# Patient Record
Sex: MALE | Race: WHITE | NOT HISPANIC OR LATINO | Employment: OTHER | URBAN - METROPOLITAN AREA
[De-identification: names, ages, dates, MRNs, and addresses within clinical notes are randomized per-mention and may not be internally consistent; named-entity substitution may affect disease eponyms.]

---

## 2017-07-26 ENCOUNTER — APPOINTMENT (OUTPATIENT)
Dept: CT IMAGING | Facility: HOSPITAL | Age: 82
End: 2017-07-26

## 2017-07-26 ENCOUNTER — HOSPITAL ENCOUNTER (INPATIENT)
Facility: HOSPITAL | Age: 82
LOS: 7 days | Discharge: NURSING, REHAB OR LONG-TERM CARE | End: 2017-08-03
Attending: EMERGENCY MEDICINE | Admitting: INTERNAL MEDICINE

## 2017-07-26 ENCOUNTER — APPOINTMENT (OUTPATIENT)
Dept: GENERAL RADIOLOGY | Facility: HOSPITAL | Age: 82
End: 2017-07-26

## 2017-07-26 DIAGNOSIS — R41.0 CONFUSION: ICD-10-CM

## 2017-07-26 DIAGNOSIS — D72.829 LEUKOCYTOSIS, UNSPECIFIED TYPE: ICD-10-CM

## 2017-07-26 DIAGNOSIS — N28.9 RENAL INSUFFICIENCY: ICD-10-CM

## 2017-07-26 DIAGNOSIS — R10.32 LEFT LOWER QUADRANT PAIN: Primary | ICD-10-CM

## 2017-07-26 DIAGNOSIS — R53.1 GENERALIZED WEAKNESS: ICD-10-CM

## 2017-07-26 PROBLEM — N17.9 AKI (ACUTE KIDNEY INJURY) (HCC): Status: ACTIVE | Noted: 2017-07-26

## 2017-07-26 LAB
ALBUMIN SERPL-MCNC: 3.8 G/DL (ref 3.5–5.2)
ALBUMIN/GLOB SERPL: 0.9 G/DL
ALP SERPL-CCNC: 71 U/L (ref 39–117)
ALT SERPL W P-5'-P-CCNC: 48 U/L (ref 1–41)
ANION GAP SERPL CALCULATED.3IONS-SCNC: 13.6 MMOL/L
AST SERPL-CCNC: 44 U/L (ref 1–40)
BASOPHILS # BLD AUTO: 0.01 10*3/MM3 (ref 0–0.2)
BASOPHILS NFR BLD AUTO: 0.1 % (ref 0–1.5)
BILIRUB SERPL-MCNC: 0.7 MG/DL (ref 0.1–1.2)
BILIRUB UR QL STRIP: NEGATIVE
BUN BLD-MCNC: 32 MG/DL (ref 8–23)
BUN/CREAT SERPL: 20.4 (ref 7–25)
CALCIUM SPEC-SCNC: 10.8 MG/DL (ref 8.6–10.5)
CHLORIDE SERPL-SCNC: 94 MMOL/L (ref 98–107)
CLARITY UR: CLEAR
CO2 SERPL-SCNC: 29.4 MMOL/L (ref 22–29)
COLOR UR: YELLOW
CREAT BLD-MCNC: 1.57 MG/DL (ref 0.76–1.27)
DEPRECATED RDW RBC AUTO: 47.5 FL (ref 37–54)
EOSINOPHIL # BLD AUTO: 0.01 10*3/MM3 (ref 0–0.7)
EOSINOPHIL NFR BLD AUTO: 0.1 % (ref 0.3–6.2)
ERYTHROCYTE [DISTWIDTH] IN BLOOD BY AUTOMATED COUNT: 13.8 % (ref 11.5–14.5)
GFR SERPL CREATININE-BSD FRML MDRD: 42 ML/MIN/1.73
GLOBULIN UR ELPH-MCNC: 4.4 GM/DL
GLUCOSE BLD-MCNC: 120 MG/DL (ref 65–99)
GLUCOSE BLDC GLUCOMTR-MCNC: 112 MG/DL (ref 70–130)
GLUCOSE UR STRIP-MCNC: NEGATIVE MG/DL
HCT VFR BLD AUTO: 42.4 % (ref 40.4–52.2)
HGB BLD-MCNC: 13.7 G/DL (ref 13.7–17.6)
HGB UR QL STRIP.AUTO: ABNORMAL
HOLD SPECIMEN: NORMAL
HOLD SPECIMEN: NORMAL
IMM GRANULOCYTES # BLD: 0.11 10*3/MM3 (ref 0–0.03)
IMM GRANULOCYTES NFR BLD: 0.8 % (ref 0–0.5)
KETONES UR QL STRIP: NEGATIVE
LEUKOCYTE ESTERASE UR QL STRIP.AUTO: ABNORMAL
LYMPHOCYTES # BLD AUTO: 1.75 10*3/MM3 (ref 0.9–4.8)
LYMPHOCYTES NFR BLD AUTO: 12.7 % (ref 19.6–45.3)
MAGNESIUM SERPL-MCNC: 2.6 MG/DL (ref 1.6–2.4)
MCH RBC QN AUTO: 30.3 PG (ref 27–32.7)
MCHC RBC AUTO-ENTMCNC: 32.3 G/DL (ref 32.6–36.4)
MCV RBC AUTO: 93.8 FL (ref 79.8–96.2)
MONOCYTES # BLD AUTO: 1.74 10*3/MM3 (ref 0.2–1.2)
MONOCYTES NFR BLD AUTO: 12.6 % (ref 5–12)
NEUTROPHILS # BLD AUTO: 10.17 10*3/MM3 (ref 1.9–8.1)
NEUTROPHILS NFR BLD AUTO: 73.7 % (ref 42.7–76)
NITRITE UR QL STRIP: NEGATIVE
PH UR STRIP.AUTO: 7 [PH] (ref 5–8)
PLATELET # BLD AUTO: 440 10*3/MM3 (ref 140–500)
PMV BLD AUTO: 9.7 FL (ref 6–12)
POTASSIUM BLD-SCNC: 4.5 MMOL/L (ref 3.5–5.2)
PROT SERPL-MCNC: 8.2 G/DL (ref 6–8.5)
PROT UR QL STRIP: NEGATIVE
RBC # BLD AUTO: 4.52 10*6/MM3 (ref 4.6–6)
SODIUM BLD-SCNC: 137 MMOL/L (ref 136–145)
SP GR UR STRIP: 1.01 (ref 1–1.03)
TROPONIN T SERPL-MCNC: <0.01 NG/ML (ref 0–0.03)
UROBILINOGEN UR QL STRIP: ABNORMAL
WBC NRBC COR # BLD: 13.79 10*3/MM3 (ref 4.5–10.7)
WHOLE BLOOD HOLD SPECIMEN: NORMAL
WHOLE BLOOD HOLD SPECIMEN: NORMAL

## 2017-07-26 PROCEDURE — 83735 ASSAY OF MAGNESIUM: CPT | Performed by: EMERGENCY MEDICINE

## 2017-07-26 PROCEDURE — 93005 ELECTROCARDIOGRAM TRACING: CPT

## 2017-07-26 PROCEDURE — 87186 SC STD MICRODIL/AGAR DIL: CPT | Performed by: EMERGENCY MEDICINE

## 2017-07-26 PROCEDURE — 93010 ELECTROCARDIOGRAM REPORT: CPT | Performed by: INTERNAL MEDICINE

## 2017-07-26 PROCEDURE — 81003 URINALYSIS AUTO W/O SCOPE: CPT | Performed by: EMERGENCY MEDICINE

## 2017-07-26 PROCEDURE — 87077 CULTURE AEROBIC IDENTIFY: CPT | Performed by: EMERGENCY MEDICINE

## 2017-07-26 PROCEDURE — 71010 HC CHEST PA OR AP: CPT

## 2017-07-26 PROCEDURE — 87086 URINE CULTURE/COLONY COUNT: CPT | Performed by: EMERGENCY MEDICINE

## 2017-07-26 PROCEDURE — 74176 CT ABD & PELVIS W/O CONTRAST: CPT

## 2017-07-26 PROCEDURE — 82962 GLUCOSE BLOOD TEST: CPT

## 2017-07-26 PROCEDURE — 80053 COMPREHEN METABOLIC PANEL: CPT | Performed by: EMERGENCY MEDICINE

## 2017-07-26 PROCEDURE — 84484 ASSAY OF TROPONIN QUANT: CPT | Performed by: EMERGENCY MEDICINE

## 2017-07-26 PROCEDURE — 73030 X-RAY EXAM OF SHOULDER: CPT

## 2017-07-26 PROCEDURE — 81001 URINALYSIS AUTO W/SCOPE: CPT | Performed by: EMERGENCY MEDICINE

## 2017-07-26 PROCEDURE — 85025 COMPLETE CBC W/AUTO DIFF WBC: CPT | Performed by: EMERGENCY MEDICINE

## 2017-07-26 PROCEDURE — 99285 EMERGENCY DEPT VISIT HI MDM: CPT

## 2017-07-26 PROCEDURE — 70450 CT HEAD/BRAIN W/O DYE: CPT

## 2017-07-26 RX ORDER — DIPHENOXYLATE HYDROCHLORIDE AND ATROPINE SULFATE 2.5; .025 MG/1; MG/1
1 TABLET ORAL DAILY
COMMUNITY

## 2017-07-26 RX ORDER — AZELASTINE 1 MG/ML
2 SPRAY, METERED NASAL DAILY PRN
COMMUNITY

## 2017-07-26 RX ORDER — FENOFIBRATE 145 MG/1
145 TABLET, COATED ORAL DAILY
COMMUNITY
End: 2017-08-03 | Stop reason: HOSPADM

## 2017-07-26 RX ORDER — ACETAMINOPHEN 325 MG/1
650 TABLET ORAL EVERY 4 HOURS PRN
Status: DISCONTINUED | OUTPATIENT
Start: 2017-07-26 | End: 2017-08-03 | Stop reason: HOSPADM

## 2017-07-26 RX ORDER — SODIUM CHLORIDE 0.9 % (FLUSH) 0.9 %
1-10 SYRINGE (ML) INJECTION AS NEEDED
Status: DISCONTINUED | OUTPATIENT
Start: 2017-07-26 | End: 2017-08-03 | Stop reason: HOSPADM

## 2017-07-26 RX ORDER — ONDANSETRON 4 MG/1
4 TABLET, FILM COATED ORAL EVERY 6 HOURS PRN
Status: DISCONTINUED | OUTPATIENT
Start: 2017-07-26 | End: 2017-08-03 | Stop reason: HOSPADM

## 2017-07-26 RX ORDER — DOXAZOSIN MESYLATE 4 MG/1
4 TABLET ORAL EVERY MORNING
COMMUNITY

## 2017-07-26 RX ORDER — SODIUM CHLORIDE 9 MG/ML
125 INJECTION, SOLUTION INTRAVENOUS CONTINUOUS
Status: DISCONTINUED | OUTPATIENT
Start: 2017-07-26 | End: 2017-07-28

## 2017-07-26 RX ORDER — CHLORAL HYDRATE 500 MG
1000 CAPSULE ORAL
COMMUNITY
End: 2017-08-03 | Stop reason: HOSPADM

## 2017-07-26 RX ORDER — PREGABALIN 50 MG/1
50 CAPSULE ORAL 2 TIMES DAILY
COMMUNITY

## 2017-07-26 RX ORDER — RIVASTIGMINE TARTRATE 1.5 MG/1
1.5 CAPSULE ORAL 2 TIMES DAILY
COMMUNITY

## 2017-07-26 RX ORDER — PREDNISONE 10 MG/1
10 TABLET ORAL DAILY
COMMUNITY
Start: 2017-07-21 | End: 2017-08-03 | Stop reason: HOSPADM

## 2017-07-26 RX ORDER — TRAMADOL HYDROCHLORIDE 50 MG/1
50 TABLET ORAL EVERY 6 HOURS PRN
Status: ON HOLD | COMMUNITY
End: 2017-08-03

## 2017-07-26 RX ORDER — POLYETHYLENE GLYCOL 3350 17 G/17G
17 POWDER, FOR SOLUTION ORAL DAILY
COMMUNITY
End: 2017-08-03 | Stop reason: HOSPADM

## 2017-07-26 RX ORDER — MEMANTINE HYDROCHLORIDE 10 MG/1
10 TABLET ORAL NIGHTLY
COMMUNITY

## 2017-07-26 RX ORDER — CARVEDILOL 3.12 MG/1
3.12 TABLET ORAL 2 TIMES DAILY WITH MEALS
COMMUNITY

## 2017-07-26 RX ORDER — ASPIRIN 81 MG/1
81 TABLET ORAL DAILY
COMMUNITY

## 2017-07-26 RX ORDER — METAXALONE 800 MG/1
800 TABLET ORAL EVERY 8 HOURS
COMMUNITY
End: 2017-08-03 | Stop reason: HOSPADM

## 2017-07-26 RX ORDER — SODIUM CHLORIDE 0.9 % (FLUSH) 0.9 %
10 SYRINGE (ML) INJECTION AS NEEDED
Status: DISCONTINUED | OUTPATIENT
Start: 2017-07-26 | End: 2017-08-03 | Stop reason: HOSPADM

## 2017-07-26 RX ORDER — PREGABALIN 75 MG/1
75 CAPSULE ORAL NIGHTLY
COMMUNITY
End: 2017-08-03 | Stop reason: HOSPADM

## 2017-07-26 RX ORDER — SODIUM CHLORIDE 9 MG/ML
125 INJECTION, SOLUTION INTRAVENOUS CONTINUOUS
Status: DISCONTINUED | OUTPATIENT
Start: 2017-07-26 | End: 2017-07-26

## 2017-07-26 RX ORDER — ONDANSETRON 4 MG/1
4 TABLET, ORALLY DISINTEGRATING ORAL EVERY 6 HOURS PRN
Status: DISCONTINUED | OUTPATIENT
Start: 2017-07-26 | End: 2017-08-03 | Stop reason: HOSPADM

## 2017-07-26 RX ORDER — ONDANSETRON 2 MG/ML
4 INJECTION INTRAMUSCULAR; INTRAVENOUS EVERY 6 HOURS PRN
Status: DISCONTINUED | OUTPATIENT
Start: 2017-07-26 | End: 2017-08-03 | Stop reason: HOSPADM

## 2017-07-26 RX ADMIN — SODIUM CHLORIDE 500 ML: 9 INJECTION, SOLUTION INTRAVENOUS at 16:47

## 2017-07-26 RX ADMIN — SODIUM CHLORIDE 125 ML/HR: 9 INJECTION, SOLUTION INTRAVENOUS at 18:54

## 2017-07-27 ENCOUNTER — APPOINTMENT (OUTPATIENT)
Dept: MRI IMAGING | Facility: HOSPITAL | Age: 82
End: 2017-07-27
Attending: INTERNAL MEDICINE

## 2017-07-27 ENCOUNTER — APPOINTMENT (OUTPATIENT)
Dept: GENERAL RADIOLOGY | Facility: HOSPITAL | Age: 82
End: 2017-07-27
Attending: ORTHOPAEDIC SURGERY

## 2017-07-27 ENCOUNTER — APPOINTMENT (OUTPATIENT)
Dept: GENERAL RADIOLOGY | Facility: HOSPITAL | Age: 82
End: 2017-07-27

## 2017-07-27 PROBLEM — D72.829 LEUKOCYTOSIS: Status: ACTIVE | Noted: 2017-07-27

## 2017-07-27 PROBLEM — R13.12 OROPHARYNGEAL DYSPHAGIA: Status: ACTIVE | Noted: 2017-07-27

## 2017-07-27 PROBLEM — M25.511 RIGHT SHOULDER PAIN: Status: ACTIVE | Noted: 2017-07-27

## 2017-07-27 PROBLEM — I48.0 PAROXYSMAL ATRIAL FIBRILLATION (HCC): Status: ACTIVE | Noted: 2017-07-27

## 2017-07-27 PROBLEM — I48.91 A-FIB (HCC): Status: ACTIVE | Noted: 2017-07-27

## 2017-07-27 PROBLEM — R41.0 CONFUSION: Status: ACTIVE | Noted: 2017-07-27

## 2017-07-27 LAB
ALBUMIN SERPL-MCNC: 3 G/DL (ref 3.5–5.2)
ALBUMIN/GLOB SERPL: 0.8 G/DL
ALP SERPL-CCNC: 57 U/L (ref 39–117)
ALT SERPL W P-5'-P-CCNC: 31 U/L (ref 1–41)
ANION GAP SERPL CALCULATED.3IONS-SCNC: 15.3 MMOL/L
AST SERPL-CCNC: 26 U/L (ref 1–40)
B PERT DNA SPEC QL NAA+PROBE: NOT DETECTED
BACTERIA UR QL AUTO: ABNORMAL /HPF
BASOPHILS # BLD AUTO: 0.01 10*3/MM3 (ref 0–0.2)
BASOPHILS NFR BLD AUTO: 0.1 % (ref 0–1.5)
BILIRUB SERPL-MCNC: 0.7 MG/DL (ref 0.1–1.2)
BUN BLD-MCNC: 26 MG/DL (ref 8–23)
BUN/CREAT SERPL: 21.8 (ref 7–25)
C PNEUM DNA NPH QL NAA+NON-PROBE: NOT DETECTED
CALCIUM SPEC-SCNC: 9 MG/DL (ref 8.6–10.5)
CHLORIDE SERPL-SCNC: 101 MMOL/L (ref 98–107)
CHOLEST SERPL-MCNC: 80 MG/DL (ref 0–200)
CO2 SERPL-SCNC: 22.7 MMOL/L (ref 22–29)
CREAT BLD-MCNC: 1.19 MG/DL (ref 0.76–1.27)
CRP SERPL-MCNC: 25.66 MG/DL (ref 0–0.5)
DEPRECATED RDW RBC AUTO: 46.9 FL (ref 37–54)
DEPRECATED RDW RBC AUTO: 47.4 FL (ref 37–54)
EOSINOPHIL # BLD AUTO: 0.01 10*3/MM3 (ref 0–0.7)
EOSINOPHIL NFR BLD AUTO: 0.1 % (ref 0.3–6.2)
ERYTHROCYTE [DISTWIDTH] IN BLOOD BY AUTOMATED COUNT: 13.8 % (ref 11.5–14.5)
ERYTHROCYTE [DISTWIDTH] IN BLOOD BY AUTOMATED COUNT: 13.8 % (ref 11.5–14.5)
ERYTHROCYTE [SEDIMENTATION RATE] IN BLOOD: 73 MM/HR (ref 0–20)
FLUAV H1 2009 PAND RNA NPH QL NAA+PROBE: NOT DETECTED
FLUAV H1 HA GENE NPH QL NAA+PROBE: NOT DETECTED
FLUAV H3 RNA NPH QL NAA+PROBE: NOT DETECTED
FLUAV SUBTYP SPEC NAA+PROBE: NOT DETECTED
FLUBV RNA ISLT QL NAA+PROBE: NOT DETECTED
GFR SERPL CREATININE-BSD FRML MDRD: 58 ML/MIN/1.73
GLOBULIN UR ELPH-MCNC: 3.8 GM/DL
GLUCOSE BLD-MCNC: 107 MG/DL (ref 65–99)
GLUCOSE BLDC GLUCOMTR-MCNC: 116 MG/DL (ref 70–130)
GLUCOSE BLDC GLUCOMTR-MCNC: 157 MG/DL (ref 70–130)
GLUCOSE BLDC GLUCOMTR-MCNC: 97 MG/DL (ref 70–130)
HADV DNA SPEC NAA+PROBE: NOT DETECTED
HBA1C MFR BLD: 5 % (ref 4.8–5.6)
HCOV 229E RNA SPEC QL NAA+PROBE: NOT DETECTED
HCOV HKU1 RNA SPEC QL NAA+PROBE: NOT DETECTED
HCOV NL63 RNA SPEC QL NAA+PROBE: NOT DETECTED
HCOV OC43 RNA SPEC QL NAA+PROBE: NOT DETECTED
HCT VFR BLD AUTO: 37.2 % (ref 40.4–52.2)
HCT VFR BLD AUTO: 37.4 % (ref 40.4–52.2)
HDLC SERPL-MCNC: 19 MG/DL (ref 40–60)
HGB BLD-MCNC: 12.1 G/DL (ref 13.7–17.6)
HGB BLD-MCNC: 12.2 G/DL (ref 13.7–17.6)
HMPV RNA NPH QL NAA+NON-PROBE: NOT DETECTED
HPIV1 RNA SPEC QL NAA+PROBE: NOT DETECTED
HPIV2 RNA SPEC QL NAA+PROBE: NOT DETECTED
HPIV3 RNA NPH QL NAA+PROBE: NOT DETECTED
HPIV4 P GENE NPH QL NAA+PROBE: NOT DETECTED
HYALINE CASTS UR QL AUTO: ABNORMAL /LPF
IMM GRANULOCYTES # BLD: 0.11 10*3/MM3 (ref 0–0.03)
IMM GRANULOCYTES NFR BLD: 0.8 % (ref 0–0.5)
LDLC SERPL CALC-MCNC: 48 MG/DL (ref 0–100)
LDLC/HDLC SERPL: 2.51 {RATIO}
LYMPHOCYTES # BLD AUTO: 1.55 10*3/MM3 (ref 0.9–4.8)
LYMPHOCYTES NFR BLD AUTO: 11.2 % (ref 19.6–45.3)
M PNEUMO IGG SER IA-ACNC: NOT DETECTED
MCH RBC QN AUTO: 30.4 PG (ref 27–32.7)
MCH RBC QN AUTO: 30.5 PG (ref 27–32.7)
MCHC RBC AUTO-ENTMCNC: 32.4 G/DL (ref 32.6–36.4)
MCHC RBC AUTO-ENTMCNC: 32.8 G/DL (ref 32.6–36.4)
MCV RBC AUTO: 92.8 FL (ref 79.8–96.2)
MCV RBC AUTO: 94.2 FL (ref 79.8–96.2)
MONOCYTES # BLD AUTO: 1.88 10*3/MM3 (ref 0.2–1.2)
MONOCYTES NFR BLD AUTO: 13.5 % (ref 5–12)
NEUTROPHILS # BLD AUTO: 10.34 10*3/MM3 (ref 1.9–8.1)
NEUTROPHILS NFR BLD AUTO: 74.3 % (ref 42.7–76)
PLATELET # BLD AUTO: 375 10*3/MM3 (ref 140–500)
PLATELET # BLD AUTO: 401 10*3/MM3 (ref 140–500)
PMV BLD AUTO: 9.6 FL (ref 6–12)
PMV BLD AUTO: 9.7 FL (ref 6–12)
POTASSIUM BLD-SCNC: 4.1 MMOL/L (ref 3.5–5.2)
PROCALCITONIN SERPL-MCNC: 0.29 NG/ML (ref 0.1–0.25)
PROT SERPL-MCNC: 6.8 G/DL (ref 6–8.5)
RBC # BLD AUTO: 3.97 10*6/MM3 (ref 4.6–6)
RBC # BLD AUTO: 4.01 10*6/MM3 (ref 4.6–6)
RBC # UR: ABNORMAL /HPF
REF LAB TEST METHOD: ABNORMAL
RHINOVIRUS RNA SPEC NAA+PROBE: NOT DETECTED
RSV RNA NPH QL NAA+NON-PROBE: NOT DETECTED
SODIUM BLD-SCNC: 139 MMOL/L (ref 136–145)
SQUAMOUS #/AREA URNS HPF: ABNORMAL /HPF
TRIGL SERPL-MCNC: 67 MG/DL (ref 0–150)
TSH SERPL DL<=0.05 MIU/L-ACNC: 0.96 MIU/ML (ref 0.27–4.2)
VIT B12 BLD-MCNC: 377 PG/ML (ref 211–946)
VLDLC SERPL-MCNC: 13.4 MG/DL (ref 5–40)
WBC NRBC COR # BLD: 13.9 10*3/MM3 (ref 4.5–10.7)
WBC NRBC COR # BLD: 15.31 10*3/MM3 (ref 4.5–10.7)
WBC UR QL AUTO: ABNORMAL /HPF

## 2017-07-27 PROCEDURE — 73110 X-RAY EXAM OF WRIST: CPT

## 2017-07-27 PROCEDURE — G0378 HOSPITAL OBSERVATION PER HR: HCPCS

## 2017-07-27 PROCEDURE — 80061 LIPID PANEL: CPT | Performed by: INTERNAL MEDICINE

## 2017-07-27 PROCEDURE — G8996 SWALLOW CURRENT STATUS: HCPCS | Performed by: SPEECH-LANGUAGE PATHOLOGIST

## 2017-07-27 PROCEDURE — 87486 CHLMYD PNEUM DNA AMP PROBE: CPT | Performed by: INTERNAL MEDICINE

## 2017-07-27 PROCEDURE — 70544 MR ANGIOGRAPHY HEAD W/O DYE: CPT

## 2017-07-27 PROCEDURE — 83036 HEMOGLOBIN GLYCOSYLATED A1C: CPT | Performed by: INTERNAL MEDICINE

## 2017-07-27 PROCEDURE — 0 GADOBENATE DIMEGLUMINE 529 MG/ML SOLUTION: Performed by: INTERNAL MEDICINE

## 2017-07-27 PROCEDURE — 92610 EVALUATE SWALLOWING FUNCTION: CPT | Performed by: SPEECH-LANGUAGE PATHOLOGIST

## 2017-07-27 PROCEDURE — 97162 PT EVAL MOD COMPLEX 30 MIN: CPT

## 2017-07-27 PROCEDURE — 70553 MRI BRAIN STEM W/O & W/DYE: CPT

## 2017-07-27 PROCEDURE — 87633 RESP VIRUS 12-25 TARGETS: CPT | Performed by: INTERNAL MEDICINE

## 2017-07-27 PROCEDURE — 74230 X-RAY XM SWLNG FUNCJ C+: CPT

## 2017-07-27 PROCEDURE — G8998 SWALLOW D/C STATUS: HCPCS | Performed by: SPEECH-LANGUAGE PATHOLOGIST

## 2017-07-27 PROCEDURE — 97110 THERAPEUTIC EXERCISES: CPT

## 2017-07-27 PROCEDURE — 25010000002 MORPHINE PER 10 MG: Performed by: INTERNAL MEDICINE

## 2017-07-27 PROCEDURE — G8997 SWALLOW GOAL STATUS: HCPCS | Performed by: SPEECH-LANGUAGE PATHOLOGIST

## 2017-07-27 PROCEDURE — 87581 M.PNEUMON DNA AMP PROBE: CPT | Performed by: INTERNAL MEDICINE

## 2017-07-27 PROCEDURE — 87040 BLOOD CULTURE FOR BACTERIA: CPT | Performed by: INTERNAL MEDICINE

## 2017-07-27 PROCEDURE — 84443 ASSAY THYROID STIM HORMONE: CPT | Performed by: INTERNAL MEDICINE

## 2017-07-27 PROCEDURE — 70549 MR ANGIOGRAPH NECK W/O&W/DYE: CPT

## 2017-07-27 PROCEDURE — 85652 RBC SED RATE AUTOMATED: CPT | Performed by: ORTHOPAEDIC SURGERY

## 2017-07-27 PROCEDURE — 85025 COMPLETE CBC W/AUTO DIFF WBC: CPT | Performed by: INTERNAL MEDICINE

## 2017-07-27 PROCEDURE — 85027 COMPLETE CBC AUTOMATED: CPT | Performed by: ORTHOPAEDIC SURGERY

## 2017-07-27 PROCEDURE — 25010000002 CEFTRIAXONE PER 250 MG: Performed by: INTERNAL MEDICINE

## 2017-07-27 PROCEDURE — 63710000001 PREDNISONE PER 5 MG: Performed by: INTERNAL MEDICINE

## 2017-07-27 PROCEDURE — 92611 MOTION FLUOROSCOPY/SWALLOW: CPT | Performed by: SPEECH-LANGUAGE PATHOLOGIST

## 2017-07-27 PROCEDURE — 82962 GLUCOSE BLOOD TEST: CPT

## 2017-07-27 PROCEDURE — 71020 HC CHEST PA AND LATERAL: CPT

## 2017-07-27 PROCEDURE — 86140 C-REACTIVE PROTEIN: CPT | Performed by: ORTHOPAEDIC SURGERY

## 2017-07-27 PROCEDURE — A9577 INJ MULTIHANCE: HCPCS | Performed by: INTERNAL MEDICINE

## 2017-07-27 PROCEDURE — 87798 DETECT AGENT NOS DNA AMP: CPT | Performed by: INTERNAL MEDICINE

## 2017-07-27 PROCEDURE — 80053 COMPREHEN METABOLIC PANEL: CPT | Performed by: INTERNAL MEDICINE

## 2017-07-27 PROCEDURE — 84145 PROCALCITONIN (PCT): CPT | Performed by: INTERNAL MEDICINE

## 2017-07-27 PROCEDURE — 82607 VITAMIN B-12: CPT | Performed by: INTERNAL MEDICINE

## 2017-07-27 RX ORDER — MORPHINE SULFATE 2 MG/ML
2 INJECTION, SOLUTION INTRAMUSCULAR; INTRAVENOUS
Status: DISCONTINUED | OUTPATIENT
Start: 2017-07-27 | End: 2017-08-03 | Stop reason: HOSPADM

## 2017-07-27 RX ORDER — TERAZOSIN 5 MG/1
5 CAPSULE ORAL NIGHTLY
Status: DISCONTINUED | OUTPATIENT
Start: 2017-07-27 | End: 2017-08-03 | Stop reason: HOSPADM

## 2017-07-27 RX ORDER — NALOXONE HYDROCHLORIDE 1 MG/ML
0.4 INJECTION INTRAMUSCULAR; INTRAVENOUS; SUBCUTANEOUS
Status: DISCONTINUED | OUTPATIENT
Start: 2017-07-27 | End: 2017-08-03 | Stop reason: HOSPADM

## 2017-07-27 RX ORDER — PREGABALIN 50 MG/1
50 CAPSULE ORAL 2 TIMES DAILY
Status: DISCONTINUED | OUTPATIENT
Start: 2017-07-27 | End: 2017-08-03 | Stop reason: HOSPADM

## 2017-07-27 RX ORDER — METHYLPREDNISOLONE ACETATE 40 MG/ML
40 INJECTION, SUSPENSION INTRA-ARTICULAR; INTRALESIONAL; INTRAMUSCULAR; SOFT TISSUE ONCE
Status: COMPLETED | OUTPATIENT
Start: 2017-07-27 | End: 2017-07-28

## 2017-07-27 RX ORDER — CARVEDILOL 3.12 MG/1
3.12 TABLET ORAL 2 TIMES DAILY WITH MEALS
Status: DISCONTINUED | OUTPATIENT
Start: 2017-07-27 | End: 2017-08-03 | Stop reason: HOSPADM

## 2017-07-27 RX ORDER — DOCUSATE SODIUM 50 MG/5 ML
100 LIQUID (ML) ORAL 2 TIMES DAILY
Status: DISCONTINUED | OUTPATIENT
Start: 2017-07-27 | End: 2017-07-29

## 2017-07-27 RX ORDER — CEFTRIAXONE SODIUM 1 G/50ML
1 INJECTION, SOLUTION INTRAVENOUS EVERY 24 HOURS
Status: DISCONTINUED | OUTPATIENT
Start: 2017-07-27 | End: 2017-07-31

## 2017-07-27 RX ORDER — OXYCODONE HYDROCHLORIDE AND ACETAMINOPHEN 5; 325 MG/1; MG/1
1 TABLET ORAL EVERY 4 HOURS PRN
Status: DISCONTINUED | OUTPATIENT
Start: 2017-07-27 | End: 2017-08-03 | Stop reason: HOSPADM

## 2017-07-27 RX ORDER — ASPIRIN 81 MG/1
81 TABLET ORAL DAILY
Status: DISCONTINUED | OUTPATIENT
Start: 2017-07-27 | End: 2017-07-27 | Stop reason: CLARIF

## 2017-07-27 RX ORDER — POLYETHYLENE GLYCOL 3350 17 G/17G
17 POWDER, FOR SOLUTION ORAL DAILY
Status: DISCONTINUED | OUTPATIENT
Start: 2017-07-27 | End: 2017-07-30

## 2017-07-27 RX ORDER — ASPIRIN 81 MG/1
81 TABLET, CHEWABLE ORAL DAILY
Status: DISCONTINUED | OUTPATIENT
Start: 2017-07-27 | End: 2017-08-03 | Stop reason: HOSPADM

## 2017-07-27 RX ORDER — PREDNISONE 10 MG/1
10 TABLET ORAL DAILY
Status: COMPLETED | OUTPATIENT
Start: 2017-07-27 | End: 2017-07-28

## 2017-07-27 RX ORDER — ATORVASTATIN CALCIUM 80 MG/1
80 TABLET, FILM COATED ORAL NIGHTLY
Status: DISCONTINUED | OUTPATIENT
Start: 2017-07-27 | End: 2017-08-03 | Stop reason: HOSPADM

## 2017-07-27 RX ORDER — CHOLECALCIFEROL (VITAMIN D3) 125 MCG
1000 CAPSULE ORAL DAILY
Status: DISCONTINUED | OUTPATIENT
Start: 2017-07-27 | End: 2017-08-03 | Stop reason: HOSPADM

## 2017-07-27 RX ORDER — MORPHINE SULFATE 2 MG/ML
1 INJECTION, SOLUTION INTRAMUSCULAR; INTRAVENOUS EVERY 4 HOURS PRN
Status: DISCONTINUED | OUTPATIENT
Start: 2017-07-27 | End: 2017-08-03 | Stop reason: HOSPADM

## 2017-07-27 RX ORDER — LIDOCAINE HYDROCHLORIDE 10 MG/ML
10 INJECTION, SOLUTION INFILTRATION; PERINEURAL ONCE
Status: DISCONTINUED | OUTPATIENT
Start: 2017-07-27 | End: 2017-07-27

## 2017-07-27 RX ORDER — DOCUSATE SODIUM 100 MG/1
100 CAPSULE, LIQUID FILLED ORAL 2 TIMES DAILY
Status: DISCONTINUED | OUTPATIENT
Start: 2017-07-27 | End: 2017-07-27

## 2017-07-27 RX ORDER — LIDOCAINE HYDROCHLORIDE 10 MG/ML
10 INJECTION, SOLUTION INFILTRATION; PERINEURAL ONCE
Status: COMPLETED | OUTPATIENT
Start: 2017-07-27 | End: 2017-07-28

## 2017-07-27 RX ORDER — SODIUM CHLORIDE 0.9 % (FLUSH) 0.9 %
1-10 SYRINGE (ML) INJECTION AS NEEDED
Status: DISCONTINUED | OUTPATIENT
Start: 2017-07-27 | End: 2017-08-03 | Stop reason: HOSPADM

## 2017-07-27 RX ADMIN — CEFTRIAXONE SODIUM 1 G: 1 INJECTION, SOLUTION INTRAVENOUS at 22:09

## 2017-07-27 RX ADMIN — MORPHINE SULFATE 2 MG: 2 INJECTION, SOLUTION INTRAMUSCULAR; INTRAVENOUS at 02:20

## 2017-07-27 RX ADMIN — CYANOCOBALAMIN TAB 500 MCG 1000 MCG: 500 TAB at 13:49

## 2017-07-27 RX ADMIN — OXYCODONE HYDROCHLORIDE AND ACETAMINOPHEN 1 TABLET: 5; 325 TABLET ORAL at 23:59

## 2017-07-27 RX ADMIN — CARVEDILOL 3.12 MG: 3.12 TABLET, FILM COATED ORAL at 13:49

## 2017-07-27 RX ADMIN — PREGABALIN 50 MG: 50 CAPSULE ORAL at 13:54

## 2017-07-27 RX ADMIN — ATORVASTATIN CALCIUM 80 MG: 80 TABLET, FILM COATED ORAL at 20:01

## 2017-07-27 RX ADMIN — MORPHINE SULFATE 1 MG: 2 INJECTION, SOLUTION INTRAMUSCULAR; INTRAVENOUS at 19:55

## 2017-07-27 RX ADMIN — APIXABAN 2.5 MG: 2.5 TABLET, FILM COATED ORAL at 19:54

## 2017-07-27 RX ADMIN — GADOBENATE DIMEGLUMINE 15 ML: 529 INJECTION, SOLUTION INTRAVENOUS at 12:11

## 2017-07-27 RX ADMIN — APIXABAN 2.5 MG: 2.5 TABLET, FILM COATED ORAL at 13:49

## 2017-07-27 RX ADMIN — SODIUM CHLORIDE 125 ML/HR: 9 INJECTION, SOLUTION INTRAVENOUS at 02:36

## 2017-07-27 RX ADMIN — TERAZOSIN HYDROCHLORIDE 5 MG: 5 CAPSULE ORAL at 20:01

## 2017-07-27 RX ADMIN — ASPIRIN 81 MG: 81 TABLET, CHEWABLE ORAL at 13:49

## 2017-07-27 RX ADMIN — SODIUM CHLORIDE 125 ML/HR: 9 INJECTION, SOLUTION INTRAVENOUS at 23:59

## 2017-07-27 RX ADMIN — CARVEDILOL 3.12 MG: 3.12 TABLET, FILM COATED ORAL at 19:54

## 2017-07-27 RX ADMIN — PREDNISONE 10 MG: 10 TABLET ORAL at 13:49

## 2017-07-28 ENCOUNTER — APPOINTMENT (OUTPATIENT)
Dept: CT IMAGING | Facility: HOSPITAL | Age: 82
End: 2017-07-28
Attending: INTERNAL MEDICINE

## 2017-07-28 LAB
ANION GAP SERPL CALCULATED.3IONS-SCNC: 9.7 MMOL/L
BASOPHILS # BLD AUTO: 0.01 10*3/MM3 (ref 0–0.2)
BASOPHILS NFR BLD AUTO: 0.1 % (ref 0–1.5)
BUN BLD-MCNC: 28 MG/DL (ref 8–23)
BUN/CREAT SERPL: 21.7 (ref 7–25)
CALCIUM SPEC-SCNC: 9.1 MG/DL (ref 8.6–10.5)
CHLORIDE SERPL-SCNC: 105 MMOL/L (ref 98–107)
CO2 SERPL-SCNC: 23.3 MMOL/L (ref 22–29)
CREAT BLD-MCNC: 1.29 MG/DL (ref 0.76–1.27)
DEPRECATED RDW RBC AUTO: 47.7 FL (ref 37–54)
EOSINOPHIL # BLD AUTO: 0.02 10*3/MM3 (ref 0–0.7)
EOSINOPHIL NFR BLD AUTO: 0.1 % (ref 0.3–6.2)
ERYTHROCYTE [DISTWIDTH] IN BLOOD BY AUTOMATED COUNT: 13.9 % (ref 11.5–14.5)
GFR SERPL CREATININE-BSD FRML MDRD: 53 ML/MIN/1.73
GLUCOSE BLD-MCNC: 104 MG/DL (ref 65–99)
GLUCOSE BLDC GLUCOMTR-MCNC: 101 MG/DL (ref 70–130)
GLUCOSE BLDC GLUCOMTR-MCNC: 122 MG/DL (ref 70–130)
GLUCOSE BLDC GLUCOMTR-MCNC: 143 MG/DL (ref 70–130)
GLUCOSE BLDC GLUCOMTR-MCNC: 97 MG/DL (ref 70–130)
HCT VFR BLD AUTO: 35.5 % (ref 40.4–52.2)
HGB BLD-MCNC: 11.4 G/DL (ref 13.7–17.6)
IMM GRANULOCYTES # BLD: 0.13 10*3/MM3 (ref 0–0.03)
IMM GRANULOCYTES NFR BLD: 0.9 % (ref 0–0.5)
LYMPHOCYTES # BLD AUTO: 1.92 10*3/MM3 (ref 0.9–4.8)
LYMPHOCYTES NFR BLD AUTO: 13.1 % (ref 19.6–45.3)
MCH RBC QN AUTO: 30.2 PG (ref 27–32.7)
MCHC RBC AUTO-ENTMCNC: 32.1 G/DL (ref 32.6–36.4)
MCV RBC AUTO: 94.2 FL (ref 79.8–96.2)
MONOCYTES # BLD AUTO: 2.03 10*3/MM3 (ref 0.2–1.2)
MONOCYTES NFR BLD AUTO: 13.8 % (ref 5–12)
NEUTROPHILS # BLD AUTO: 10.57 10*3/MM3 (ref 1.9–8.1)
NEUTROPHILS NFR BLD AUTO: 72 % (ref 42.7–76)
PLATELET # BLD AUTO: 378 10*3/MM3 (ref 140–500)
PMV BLD AUTO: 9.8 FL (ref 6–12)
POTASSIUM BLD-SCNC: 4 MMOL/L (ref 3.5–5.2)
RBC # BLD AUTO: 3.77 10*6/MM3 (ref 4.6–6)
SODIUM BLD-SCNC: 138 MMOL/L (ref 136–145)
VIT B12 BLD-MCNC: 417 PG/ML (ref 211–946)
WBC NRBC COR # BLD: 14.68 10*3/MM3 (ref 4.5–10.7)

## 2017-07-28 PROCEDURE — 3E0U3BZ INTRODUCTION OF ANESTHETIC AGENT INTO JOINTS, PERCUTANEOUS APPROACH: ICD-10-PCS | Performed by: ORTHOPAEDIC SURGERY

## 2017-07-28 PROCEDURE — G8996 SWALLOW CURRENT STATUS: HCPCS

## 2017-07-28 PROCEDURE — 25010000002 CEFTRIAXONE PER 250 MG: Performed by: INTERNAL MEDICINE

## 2017-07-28 PROCEDURE — 63710000001 PREDNISONE PER 5 MG: Performed by: INTERNAL MEDICINE

## 2017-07-28 PROCEDURE — 3E0U33Z INTRODUCTION OF ANTI-INFLAMMATORY INTO JOINTS, PERCUTANEOUS APPROACH: ICD-10-PCS | Performed by: ORTHOPAEDIC SURGERY

## 2017-07-28 PROCEDURE — G8997 SWALLOW GOAL STATUS: HCPCS

## 2017-07-28 PROCEDURE — 70498 CT ANGIOGRAPHY NECK: CPT

## 2017-07-28 PROCEDURE — 70496 CT ANGIOGRAPHY HEAD: CPT

## 2017-07-28 PROCEDURE — 85025 COMPLETE CBC W/AUTO DIFF WBC: CPT | Performed by: INTERNAL MEDICINE

## 2017-07-28 PROCEDURE — 92526 ORAL FUNCTION THERAPY: CPT

## 2017-07-28 PROCEDURE — 0 IOPAMIDOL 61 % SOLUTION: Performed by: INTERNAL MEDICINE

## 2017-07-28 PROCEDURE — 82607 VITAMIN B-12: CPT | Performed by: INTERNAL MEDICINE

## 2017-07-28 PROCEDURE — 80048 BASIC METABOLIC PNL TOTAL CA: CPT | Performed by: INTERNAL MEDICINE

## 2017-07-28 PROCEDURE — 25010000002 METHYLPREDNISOLONE PER 40 MG: Performed by: ORTHOPAEDIC SURGERY

## 2017-07-28 PROCEDURE — 97110 THERAPEUTIC EXERCISES: CPT

## 2017-07-28 PROCEDURE — 82962 GLUCOSE BLOOD TEST: CPT

## 2017-07-28 RX ADMIN — SODIUM CHLORIDE 125 ML/HR: 9 INJECTION, SOLUTION INTRAVENOUS at 11:33

## 2017-07-28 RX ADMIN — OXYCODONE HYDROCHLORIDE AND ACETAMINOPHEN 1 TABLET: 5; 325 TABLET ORAL at 10:38

## 2017-07-28 RX ADMIN — TERAZOSIN HYDROCHLORIDE 5 MG: 5 CAPSULE ORAL at 20:21

## 2017-07-28 RX ADMIN — METHYLPREDNISOLONE ACETATE 40 MG: 40 INJECTION, SUSPENSION INTRA-ARTICULAR; INTRALESIONAL; INTRAMUSCULAR; SOFT TISSUE at 12:09

## 2017-07-28 RX ADMIN — POLYETHYLENE GLYCOL 3350 17 G: 17 POWDER, FOR SOLUTION ORAL at 10:39

## 2017-07-28 RX ADMIN — CARVEDILOL 3.12 MG: 3.12 TABLET, FILM COATED ORAL at 17:25

## 2017-07-28 RX ADMIN — ASPIRIN 81 MG: 81 TABLET, CHEWABLE ORAL at 10:39

## 2017-07-28 RX ADMIN — PREDNISONE 10 MG: 10 TABLET ORAL at 10:39

## 2017-07-28 RX ADMIN — APIXABAN 2.5 MG: 2.5 TABLET, FILM COATED ORAL at 17:25

## 2017-07-28 RX ADMIN — CEFTRIAXONE SODIUM 1 G: 1 INJECTION, SOLUTION INTRAVENOUS at 18:20

## 2017-07-28 RX ADMIN — APIXABAN 2.5 MG: 2.5 TABLET, FILM COATED ORAL at 10:39

## 2017-07-28 RX ADMIN — DOCUSATE SODIUM 100 MG: 50 LIQUID ORAL at 17:25

## 2017-07-28 RX ADMIN — CARVEDILOL 3.12 MG: 3.12 TABLET, FILM COATED ORAL at 10:39

## 2017-07-28 RX ADMIN — IOPAMIDOL 85 ML: 612 INJECTION, SOLUTION INTRAVENOUS at 08:47

## 2017-07-28 RX ADMIN — LIDOCAINE HYDROCHLORIDE 10 ML: 10 INJECTION, SOLUTION INFILTRATION; PERINEURAL at 12:09

## 2017-07-28 RX ADMIN — OXYCODONE HYDROCHLORIDE AND ACETAMINOPHEN 1 TABLET: 5; 325 TABLET ORAL at 04:33

## 2017-07-28 RX ADMIN — PREGABALIN 50 MG: 50 CAPSULE ORAL at 10:38

## 2017-07-28 RX ADMIN — CYANOCOBALAMIN TAB 500 MCG 1000 MCG: 500 TAB at 10:38

## 2017-07-28 RX ADMIN — ATORVASTATIN CALCIUM 80 MG: 80 TABLET, FILM COATED ORAL at 20:21

## 2017-07-29 LAB
ANION GAP SERPL CALCULATED.3IONS-SCNC: 10.8 MMOL/L
BACTERIA SPEC AEROBE CULT: ABNORMAL
BUN BLD-MCNC: 25 MG/DL (ref 8–23)
BUN/CREAT SERPL: 22.5 (ref 7–25)
CALCIUM SPEC-SCNC: 8.9 MG/DL (ref 8.6–10.5)
CHLORIDE SERPL-SCNC: 105 MMOL/L (ref 98–107)
CO2 SERPL-SCNC: 23.2 MMOL/L (ref 22–29)
CREAT BLD-MCNC: 1.11 MG/DL (ref 0.76–1.27)
DEPRECATED RDW RBC AUTO: 46.6 FL (ref 37–54)
ERYTHROCYTE [DISTWIDTH] IN BLOOD BY AUTOMATED COUNT: 13.6 % (ref 11.5–14.5)
GFR SERPL CREATININE-BSD FRML MDRD: 63 ML/MIN/1.73
GLUCOSE BLD-MCNC: 134 MG/DL (ref 65–99)
GLUCOSE BLDC GLUCOMTR-MCNC: 113 MG/DL (ref 70–130)
GLUCOSE BLDC GLUCOMTR-MCNC: 114 MG/DL (ref 70–130)
GLUCOSE BLDC GLUCOMTR-MCNC: 115 MG/DL (ref 70–130)
GLUCOSE BLDC GLUCOMTR-MCNC: 119 MG/DL (ref 70–130)
HCT VFR BLD AUTO: 33.1 % (ref 40.4–52.2)
HGB BLD-MCNC: 10.8 G/DL (ref 13.7–17.6)
MCH RBC QN AUTO: 30.3 PG (ref 27–32.7)
MCHC RBC AUTO-ENTMCNC: 32.6 G/DL (ref 32.6–36.4)
MCV RBC AUTO: 92.7 FL (ref 79.8–96.2)
PLATELET # BLD AUTO: 414 10*3/MM3 (ref 140–500)
PMV BLD AUTO: 9.8 FL (ref 6–12)
POTASSIUM BLD-SCNC: 3.9 MMOL/L (ref 3.5–5.2)
RBC # BLD AUTO: 3.57 10*6/MM3 (ref 4.6–6)
SODIUM BLD-SCNC: 139 MMOL/L (ref 136–145)
WBC NRBC COR # BLD: 13.07 10*3/MM3 (ref 4.5–10.7)

## 2017-07-29 PROCEDURE — 25010000002 CEFTRIAXONE PER 250 MG: Performed by: INTERNAL MEDICINE

## 2017-07-29 PROCEDURE — 97535 SELF CARE MNGMENT TRAINING: CPT

## 2017-07-29 PROCEDURE — 25010000002 MORPHINE PER 10 MG: Performed by: INTERNAL MEDICINE

## 2017-07-29 PROCEDURE — 97110 THERAPEUTIC EXERCISES: CPT

## 2017-07-29 PROCEDURE — 80048 BASIC METABOLIC PNL TOTAL CA: CPT | Performed by: INTERNAL MEDICINE

## 2017-07-29 PROCEDURE — 97166 OT EVAL MOD COMPLEX 45 MIN: CPT

## 2017-07-29 PROCEDURE — 82962 GLUCOSE BLOOD TEST: CPT

## 2017-07-29 PROCEDURE — 97110 THERAPEUTIC EXERCISES: CPT | Performed by: PHYSICAL THERAPIST

## 2017-07-29 PROCEDURE — 85027 COMPLETE CBC AUTOMATED: CPT | Performed by: INTERNAL MEDICINE

## 2017-07-29 RX ORDER — SENNA AND DOCUSATE SODIUM 50; 8.6 MG/1; MG/1
2 TABLET, FILM COATED ORAL 2 TIMES DAILY
Status: DISCONTINUED | OUTPATIENT
Start: 2017-07-29 | End: 2017-07-30

## 2017-07-29 RX ADMIN — MORPHINE SULFATE 1 MG: 2 INJECTION, SOLUTION INTRAMUSCULAR; INTRAVENOUS at 09:16

## 2017-07-29 RX ADMIN — APIXABAN 2.5 MG: 2.5 TABLET, FILM COATED ORAL at 18:48

## 2017-07-29 RX ADMIN — DOCUSATE SODIUM 100 MG: 50 LIQUID ORAL at 09:15

## 2017-07-29 RX ADMIN — PREGABALIN 50 MG: 50 CAPSULE ORAL at 12:55

## 2017-07-29 RX ADMIN — PREGABALIN 50 MG: 50 CAPSULE ORAL at 09:17

## 2017-07-29 RX ADMIN — TERAZOSIN HYDROCHLORIDE 5 MG: 5 CAPSULE ORAL at 20:47

## 2017-07-29 RX ADMIN — CEFTRIAXONE SODIUM 1 G: 1 INJECTION, SOLUTION INTRAVENOUS at 18:48

## 2017-07-29 RX ADMIN — POLYETHYLENE GLYCOL 3350 17 G: 17 POWDER, FOR SOLUTION ORAL at 09:17

## 2017-07-29 RX ADMIN — CARVEDILOL 3.12 MG: 3.12 TABLET, FILM COATED ORAL at 09:16

## 2017-07-29 RX ADMIN — ATORVASTATIN CALCIUM 80 MG: 80 TABLET, FILM COATED ORAL at 20:47

## 2017-07-29 RX ADMIN — CYANOCOBALAMIN TAB 500 MCG 1000 MCG: 500 TAB at 09:16

## 2017-07-29 RX ADMIN — CARVEDILOL 3.12 MG: 3.12 TABLET, FILM COATED ORAL at 18:48

## 2017-07-29 RX ADMIN — DOCUSATE SODIUM -SENNOSIDES 2 TABLET: 50; 8.6 TABLET, COATED ORAL at 18:50

## 2017-07-29 RX ADMIN — DOCUSATE SODIUM -SENNOSIDES 2 TABLET: 50; 8.6 TABLET, COATED ORAL at 12:58

## 2017-07-29 RX ADMIN — APIXABAN 2.5 MG: 2.5 TABLET, FILM COATED ORAL at 09:16

## 2017-07-29 RX ADMIN — OXYCODONE HYDROCHLORIDE AND ACETAMINOPHEN 1 TABLET: 5; 325 TABLET ORAL at 10:57

## 2017-07-29 RX ADMIN — MORPHINE SULFATE 1 MG: 2 INJECTION, SOLUTION INTRAMUSCULAR; INTRAVENOUS at 00:22

## 2017-07-29 RX ADMIN — ASPIRIN 81 MG: 81 TABLET, CHEWABLE ORAL at 09:16

## 2017-07-30 ENCOUNTER — APPOINTMENT (OUTPATIENT)
Dept: GENERAL RADIOLOGY | Facility: HOSPITAL | Age: 82
End: 2017-07-30

## 2017-07-30 PROBLEM — N17.9 AKI (ACUTE KIDNEY INJURY) (HCC): Status: RESOLVED | Noted: 2017-07-26 | Resolved: 2017-07-30

## 2017-07-30 PROBLEM — J69.0 ASPIRATION PNEUMONIA (HCC): Status: ACTIVE | Noted: 2017-07-30

## 2017-07-30 LAB
ANION GAP SERPL CALCULATED.3IONS-SCNC: 11.3 MMOL/L
BUN BLD-MCNC: 24 MG/DL (ref 8–23)
BUN/CREAT SERPL: 24.7 (ref 7–25)
CALCIUM SPEC-SCNC: 9 MG/DL (ref 8.6–10.5)
CHLORIDE SERPL-SCNC: 103 MMOL/L (ref 98–107)
CO2 SERPL-SCNC: 23.7 MMOL/L (ref 22–29)
CREAT BLD-MCNC: 0.97 MG/DL (ref 0.76–1.27)
DEPRECATED RDW RBC AUTO: 45.9 FL (ref 37–54)
ERYTHROCYTE [DISTWIDTH] IN BLOOD BY AUTOMATED COUNT: 13.7 % (ref 11.5–14.5)
FERRITIN SERPL-MCNC: 960.1 NG/ML (ref 30–400)
GFR SERPL CREATININE-BSD FRML MDRD: 74 ML/MIN/1.73
GLUCOSE BLD-MCNC: 132 MG/DL (ref 65–99)
GLUCOSE BLDC GLUCOMTR-MCNC: 115 MG/DL (ref 70–130)
GLUCOSE BLDC GLUCOMTR-MCNC: 94 MG/DL (ref 70–130)
HCT VFR BLD AUTO: 35 % (ref 40.4–52.2)
HGB BLD-MCNC: 11.5 G/DL (ref 13.7–17.6)
IRON 24H UR-MRATE: 26 MCG/DL (ref 59–158)
IRON SATN MFR SERPL: 13 % (ref 20–50)
MCH RBC QN AUTO: 30.4 PG (ref 27–32.7)
MCHC RBC AUTO-ENTMCNC: 32.9 G/DL (ref 32.6–36.4)
MCV RBC AUTO: 92.6 FL (ref 79.8–96.2)
PLATELET # BLD AUTO: 482 10*3/MM3 (ref 140–500)
PMV BLD AUTO: 10 FL (ref 6–12)
POTASSIUM BLD-SCNC: 4.1 MMOL/L (ref 3.5–5.2)
RBC # BLD AUTO: 3.78 10*6/MM3 (ref 4.6–6)
SODIUM BLD-SCNC: 138 MMOL/L (ref 136–145)
TIBC SERPL-MCNC: 200 MCG/DL
TRANSFERRIN SERPL-MCNC: 134 MG/DL (ref 200–360)
URATE SERPL-MCNC: 2.6 MG/DL (ref 3.4–7)
WBC NRBC COR # BLD: 10.11 10*3/MM3 (ref 4.5–10.7)

## 2017-07-30 PROCEDURE — 84550 ASSAY OF BLOOD/URIC ACID: CPT | Performed by: INTERNAL MEDICINE

## 2017-07-30 PROCEDURE — 83540 ASSAY OF IRON: CPT | Performed by: INTERNAL MEDICINE

## 2017-07-30 PROCEDURE — 25010000002 CEFTRIAXONE PER 250 MG: Performed by: INTERNAL MEDICINE

## 2017-07-30 PROCEDURE — 97110 THERAPEUTIC EXERCISES: CPT

## 2017-07-30 PROCEDURE — 85027 COMPLETE CBC AUTOMATED: CPT | Performed by: INTERNAL MEDICINE

## 2017-07-30 PROCEDURE — 80048 BASIC METABOLIC PNL TOTAL CA: CPT | Performed by: INTERNAL MEDICINE

## 2017-07-30 PROCEDURE — 84466 ASSAY OF TRANSFERRIN: CPT | Performed by: INTERNAL MEDICINE

## 2017-07-30 PROCEDURE — 82728 ASSAY OF FERRITIN: CPT | Performed by: INTERNAL MEDICINE

## 2017-07-30 PROCEDURE — 73562 X-RAY EXAM OF KNEE 3: CPT

## 2017-07-30 PROCEDURE — 99222 1ST HOSP IP/OBS MODERATE 55: CPT | Performed by: RADIOLOGY

## 2017-07-30 PROCEDURE — 82962 GLUCOSE BLOOD TEST: CPT

## 2017-07-30 RX ORDER — SENNA AND DOCUSATE SODIUM 50; 8.6 MG/1; MG/1
2 TABLET, FILM COATED ORAL 2 TIMES DAILY PRN
Status: DISCONTINUED | OUTPATIENT
Start: 2017-07-30 | End: 2017-08-03 | Stop reason: HOSPADM

## 2017-07-30 RX ORDER — METHYLPREDNISOLONE ACETATE 40 MG/ML
40 INJECTION, SUSPENSION INTRA-ARTICULAR; INTRALESIONAL; INTRAMUSCULAR; SOFT TISSUE ONCE
Status: DISCONTINUED | OUTPATIENT
Start: 2017-07-31 | End: 2017-08-03 | Stop reason: HOSPADM

## 2017-07-30 RX ORDER — LIDOCAINE HYDROCHLORIDE 10 MG/ML
10 INJECTION, SOLUTION INFILTRATION; PERINEURAL ONCE
Status: DISCONTINUED | OUTPATIENT
Start: 2017-07-31 | End: 2017-08-03 | Stop reason: HOSPADM

## 2017-07-30 RX ADMIN — ATORVASTATIN CALCIUM 80 MG: 80 TABLET, FILM COATED ORAL at 20:29

## 2017-07-30 RX ADMIN — APIXABAN 2.5 MG: 2.5 TABLET, FILM COATED ORAL at 18:53

## 2017-07-30 RX ADMIN — CARVEDILOL 3.12 MG: 3.12 TABLET, FILM COATED ORAL at 09:12

## 2017-07-30 RX ADMIN — OXYCODONE HYDROCHLORIDE AND ACETAMINOPHEN 1 TABLET: 5; 325 TABLET ORAL at 12:50

## 2017-07-30 RX ADMIN — TERAZOSIN HYDROCHLORIDE 5 MG: 5 CAPSULE ORAL at 20:29

## 2017-07-30 RX ADMIN — CARVEDILOL 3.12 MG: 3.12 TABLET, FILM COATED ORAL at 18:54

## 2017-07-30 RX ADMIN — PREGABALIN 50 MG: 50 CAPSULE ORAL at 12:57

## 2017-07-30 RX ADMIN — ASPIRIN 81 MG: 81 TABLET, CHEWABLE ORAL at 09:12

## 2017-07-30 RX ADMIN — CEFTRIAXONE SODIUM 1 G: 1 INJECTION, SOLUTION INTRAVENOUS at 20:29

## 2017-07-30 RX ADMIN — PREGABALIN 50 MG: 50 CAPSULE ORAL at 09:12

## 2017-07-30 RX ADMIN — CYANOCOBALAMIN TAB 500 MCG 1000 MCG: 500 TAB at 09:12

## 2017-07-30 RX ADMIN — APIXABAN 2.5 MG: 2.5 TABLET, FILM COATED ORAL at 09:13

## 2017-07-30 RX ADMIN — OXYCODONE HYDROCHLORIDE AND ACETAMINOPHEN 1 TABLET: 5; 325 TABLET ORAL at 05:57

## 2017-07-31 PROBLEM — D72.829 LEUKOCYTOSIS: Status: RESOLVED | Noted: 2017-07-27 | Resolved: 2017-07-31

## 2017-07-31 PROBLEM — R10.32 LEFT LOWER QUADRANT PAIN: Status: RESOLVED | Noted: 2017-07-26 | Resolved: 2017-07-31

## 2017-07-31 PROBLEM — M25.462 KNEE EFFUSION, LEFT: Status: ACTIVE | Noted: 2017-07-31

## 2017-07-31 PROBLEM — R41.0 CONFUSION: Status: RESOLVED | Noted: 2017-07-27 | Resolved: 2017-07-31

## 2017-07-31 PROCEDURE — 25010000002 MORPHINE PER 10 MG: Performed by: INTERNAL MEDICINE

## 2017-07-31 PROCEDURE — 97110 THERAPEUTIC EXERCISES: CPT

## 2017-07-31 PROCEDURE — 92526 ORAL FUNCTION THERAPY: CPT

## 2017-07-31 RX ORDER — AMOXICILLIN AND CLAVULANATE POTASSIUM 250; 62.5 MG/5ML; MG/5ML
500 POWDER, FOR SUSPENSION ORAL EVERY 12 HOURS SCHEDULED
Status: COMPLETED | OUTPATIENT
Start: 2017-07-31 | End: 2017-08-02

## 2017-07-31 RX ADMIN — PREGABALIN 50 MG: 50 CAPSULE ORAL at 17:40

## 2017-07-31 RX ADMIN — PREGABALIN 50 MG: 50 CAPSULE ORAL at 09:38

## 2017-07-31 RX ADMIN — APIXABAN 2.5 MG: 2.5 TABLET, FILM COATED ORAL at 17:40

## 2017-07-31 RX ADMIN — ATORVASTATIN CALCIUM 80 MG: 80 TABLET, FILM COATED ORAL at 21:13

## 2017-07-31 RX ADMIN — OXYCODONE HYDROCHLORIDE AND ACETAMINOPHEN 1 TABLET: 5; 325 TABLET ORAL at 17:51

## 2017-07-31 RX ADMIN — APIXABAN 2.5 MG: 2.5 TABLET, FILM COATED ORAL at 09:38

## 2017-07-31 RX ADMIN — MORPHINE SULFATE 1 MG: 2 INJECTION, SOLUTION INTRAMUSCULAR; INTRAVENOUS at 21:16

## 2017-07-31 RX ADMIN — AMOXICILLIN AND CLAVULANATE POTASSIUM 500 MG: 250; 62.5 POWDER, FOR SUSPENSION ORAL at 17:39

## 2017-07-31 RX ADMIN — CARVEDILOL 3.12 MG: 3.12 TABLET, FILM COATED ORAL at 09:38

## 2017-07-31 RX ADMIN — TERAZOSIN HYDROCHLORIDE 5 MG: 5 CAPSULE ORAL at 21:13

## 2017-07-31 RX ADMIN — CYANOCOBALAMIN TAB 500 MCG 1000 MCG: 500 TAB at 09:38

## 2017-07-31 RX ADMIN — OXYCODONE HYDROCHLORIDE AND ACETAMINOPHEN 1 TABLET: 5; 325 TABLET ORAL at 09:38

## 2017-07-31 RX ADMIN — CARVEDILOL 3.12 MG: 3.12 TABLET, FILM COATED ORAL at 17:40

## 2017-07-31 RX ADMIN — ASPIRIN 81 MG: 81 TABLET, CHEWABLE ORAL at 09:38

## 2017-08-01 LAB
APPEARANCE FLD: ABNORMAL
BACTERIA SPEC AEROBE CULT: NORMAL
BACTERIA SPEC AEROBE CULT: NORMAL
COLOR FLD: YELLOW
CRYSTALS FLD MICRO: NORMAL
LYMPHOCYTES NFR FLD MANUAL: 20 %
METHOD: ABNORMAL
MONOCYTES NFR FLD: 5 %
NEUTROPHILS NFR FLD MANUAL: 75 %
NUC CELL # FLD: 1568 /MM3
RBC # FLD AUTO: 5700 /MM3

## 2017-08-01 PROCEDURE — 3E0U33Z INTRODUCTION OF ANTI-INFLAMMATORY INTO JOINTS, PERCUTANEOUS APPROACH: ICD-10-PCS | Performed by: ORTHOPAEDIC SURGERY

## 2017-08-01 PROCEDURE — 97110 THERAPEUTIC EXERCISES: CPT

## 2017-08-01 PROCEDURE — 87205 SMEAR GRAM STAIN: CPT | Performed by: ORTHOPAEDIC SURGERY

## 2017-08-01 PROCEDURE — 87015 SPECIMEN INFECT AGNT CONCNTJ: CPT | Performed by: ORTHOPAEDIC SURGERY

## 2017-08-01 PROCEDURE — 0S9D3ZZ DRAINAGE OF LEFT KNEE JOINT, PERCUTANEOUS APPROACH: ICD-10-PCS | Performed by: ORTHOPAEDIC SURGERY

## 2017-08-01 PROCEDURE — 89050 BODY FLUID CELL COUNT: CPT | Performed by: ORTHOPAEDIC SURGERY

## 2017-08-01 PROCEDURE — 89051 BODY FLUID CELL COUNT: CPT | Performed by: ORTHOPAEDIC SURGERY

## 2017-08-01 PROCEDURE — 87070 CULTURE OTHR SPECIMN AEROBIC: CPT | Performed by: ORTHOPAEDIC SURGERY

## 2017-08-01 PROCEDURE — 89060 EXAM SYNOVIAL FLUID CRYSTALS: CPT | Performed by: ORTHOPAEDIC SURGERY

## 2017-08-01 RX ADMIN — ATORVASTATIN CALCIUM 80 MG: 80 TABLET, FILM COATED ORAL at 20:17

## 2017-08-01 RX ADMIN — OXYCODONE HYDROCHLORIDE AND ACETAMINOPHEN 1 TABLET: 5; 325 TABLET ORAL at 15:15

## 2017-08-01 RX ADMIN — AMOXICILLIN AND CLAVULANATE POTASSIUM 500 MG: 250; 62.5 POWDER, FOR SUSPENSION ORAL at 20:26

## 2017-08-01 RX ADMIN — CARVEDILOL 3.12 MG: 3.12 TABLET, FILM COATED ORAL at 08:45

## 2017-08-01 RX ADMIN — ASPIRIN 81 MG: 81 TABLET, CHEWABLE ORAL at 08:42

## 2017-08-01 RX ADMIN — CYANOCOBALAMIN TAB 500 MCG 1000 MCG: 500 TAB at 08:40

## 2017-08-01 RX ADMIN — PREGABALIN 50 MG: 50 CAPSULE ORAL at 08:41

## 2017-08-01 RX ADMIN — CARVEDILOL 3.12 MG: 3.12 TABLET, FILM COATED ORAL at 17:34

## 2017-08-01 RX ADMIN — OXYCODONE HYDROCHLORIDE AND ACETAMINOPHEN 1 TABLET: 5; 325 TABLET ORAL at 08:46

## 2017-08-01 RX ADMIN — PREGABALIN 50 MG: 50 CAPSULE ORAL at 11:34

## 2017-08-01 RX ADMIN — APIXABAN 2.5 MG: 2.5 TABLET, FILM COATED ORAL at 17:34

## 2017-08-01 RX ADMIN — AMOXICILLIN AND CLAVULANATE POTASSIUM 500 MG: 250; 62.5 POWDER, FOR SUSPENSION ORAL at 08:39

## 2017-08-01 RX ADMIN — TERAZOSIN HYDROCHLORIDE 5 MG: 5 CAPSULE ORAL at 20:17

## 2017-08-01 RX ADMIN — APIXABAN 2.5 MG: 2.5 TABLET, FILM COATED ORAL at 08:42

## 2017-08-01 NOTE — THERAPY TREATMENT NOTE
Acute Care - Occupational Therapy Treatment Note  [facility]     Patient Name: Adrian Thibodeaux  : 1931  MRN: [id number]  Today's Date: 2017  Onset of Illness/Injury or Date of Surgery Date: 17            Admit Date: 2017    Visit Dx:     ICD-10-CM ICD-9-CM   1. Left lower quadrant pain R10.32 789.04   2. Leukocytosis, unspecified type D72.829 288.60   3. Renal insufficiency N28.9 593.9   4. Confusion R41.0 298.9   5. Generalized weakness R53.1 780.79     Patient Active Problem List   Diagnosis   • Right shoulder pain   • Paroxysmal atrial fibrillation   • Oropharyngeal dysphagia   • Aspiration pneumonia   • Knee effusion, left             Adult Rehabilitation Note       17 1300 17 1500 17 1608    Rehab Assessment/Intervention    Discipline occupational therapist  -SG physical therapy assistant  -CW physical therapist  -KH    Document Type therapy note (daily note)  -SG therapy note (daily note)  -CW therapy note (daily note)  -KH    Subjective Information agree to therapy  -SG agree to therapy;complains of;weakness  -CW agree to therapy;no complaints  -KH    Patient Effort, Rehab Treatment adequate  -SG adequate  -CW good  -KH    Precautions/Limitations fall precautions;oxygen therapy device and L/min  -SG fall precautions;oxygen therapy device and L/min  -CW fall precautions;oxygen therapy device and L/min  -KH    Recorded by [SG] Aniyah Lozada OTR [CW] Adonay Maddox [KH] Roxy Van, PT    Vital Signs    O2 Delivery Post Treatment  supplemental O2  -CW     Recorded by  [CW] Adonay Maddox     Pain Assessment    Pain Assessment  0-10  -CW No/denies pain  -KH    Pain Score  6  -CW     Post Pain Score  6  -CW     Pain Location  Shoulder  -CW     Pain Orientation  Right  -CW     Pain Intervention(s)  Repositioned;Ambulation/increased activity  -CW     Response to Interventions  long  -CW     Recorded by  [CW] Adonay Maddox [KH] Roxy Van, PT     Cognitive Assessment/Intervention    Current Cognitive/Communication Assessment functional  -SG functional  -CW impaired  -    Orientation Status oriented x 4  -SG oriented x 4  -CW oriented x 4  -    Follows Commands/Answers Questions 100% of the time;able to follow single-step instructions  -% of the time  -CW 75% of the time  -    Personal Safety  WNL/WFL  -CW mild impairment;decreased awareness, need for assist;decreased awareness, need for safety  -    Personal Safety Interventions  fall prevention program maintained;gait belt;muscle strengthening facilitated;nonskid shoes/slippers when out of bed  -CW fall prevention program maintained;gait belt;nonskid shoes/slippers when out of bed  -KH    Recorded by [SG] SAILAJA Fernandez [CW] dAonay Maddox [] Roxy Van, HAN    ROM (Range of Motion)    General ROM Detail RUE remains limited with AROM  -SG      Recorded by [SG] SAILAJA Fernandez      Bed Mobility, Assessment/Treatment    Bed Mob, Supine to Sit, [place] minimum assist (75% patient effort)  -SG minimum assist (75% patient effort);supervision required;verbal cues required  - minimum assist (75% patient effort);moderate assist (50% patient effort);supervision required;verbal cues required  -    Bed Mob, Sit to Supine, [place] minimum assist (75% patient effort)  -SG minimum assist (75% patient effort)  - minimum assist (75% patient effort);moderate assist (50% patient effort);2 person assist required  -    Recorded by [SG] SAILAJA Fernandez [CW] Adonay Maddox [KH] Roxy Van, PT    Transfer Assessment/Treatment    Transfers, Bed-Chair [place]   moderate assist (50% patient effort);2 person assist required;verbal cues required;nonverbal cues required (demo/gesture)  -    Transfers, Chair-Bed [place]   moderate assist (50% patient effort);2 person assist required;verbal cues required;nonverbal cues required (demo/gesture)  -     Transfers, Sit-Stand [place]   moderate assist (50% patient effort);2 person assist required;verbal cues required;nonverbal cues required (demo/gesture)  -    Transfers, Stand-Sit [place]  not tested  - moderate assist (50% patient effort);2 person assist required;verbal cues required;nonverbal cues required (demo/gesture)  -    Transfers, Sit-Stand-Sit, Assist Device   --   hand held x 2  -KH    Transfer, Safety Issues   loses balance backward   posterior lean  -    Transfer, Impairments   strength decreased;impaired balance  -    Transfer, Comment just back to bed after sitting in chair per pt and daughter and nsg  -SG  poor standing balance due to posterior lean; transferred from bed to transport bed with mod A x 2.  -KH    Recorded by [SG] SAILAJA Fernandez [CW] Adonay Maddox [KH] Roxy Van, PT    Gait Assessment/Treatment    Gait, [place] Level   moderate assist (50% patient effort);maximum assist (25% patient effort);2 person assist required;verbal cues required;nonverbal cues required (demo/gesture)  -    Gait, Assistive Device   --   hand held x 2  -KH    Gait, Distance (Feet)   --   took a couple forward and side steps to transfer beds  -    Gait, Safety Issues   balance decreased during turns;loses balance backward  -    Gait, Impairments   strength decreased;impaired balance  -    Recorded by   [KH] Roxy Van, PT    ADL Assessment/Intervention    Additional Documentation --   adls limited at this time due to RUE limited function  -SG      Recorded by [SG] SAILAJA Fernandez      Balance Skills Training    Sitting-Level of Assistance Close supervision  -SG Close supervision  -CW Contact guard;Close supervision  -    Sitting-Balance Support  Feet supported  - Right upper extremity supported;Left upper extremity supported;Feet supported  -    Sitting-Balance Activities  Trunk control activities  -CW Trunk control activities  -    Sitting # of  Minutes  10  -CW     Standing-Level of Assistance   Moderate assistance;x2  -KH    Static Standing Balance Support   Right upper extremity supported;Left upper extremity supported  -KH    Recorded by [SG] SAILAJA Fernandez [CW] Adonay Maddox [] Roxy Van, PT    Therapy Exercises    Bilateral Lower Extremities  AROM:;10 reps;sitting;ankle pumps/circles;hip flexion;LAQ  -CW     Recorded by  [CW] Adonay Maddox     Positioning and Restraints    Pre-Treatment Position in bed  -SG in bed  -CW in bed  -KH    Post Treatment Position bed  -SG bed  -CW bed  -KH    In Bed call light within reach;encouraged to call for assist;exit alarm on;with family/caregiver  -SG notified nsg;supine;call light within reach;encouraged to call for assist;exit alarm on;with family/caregiver  -CW supine;with other staff   pt leaving with transport  -KH    Recorded by [SG] SAILAJA Fernandez [CW] Adonay Maddox [KH] Roxy Van, PT      07/29/17 1419          Rehab Assessment/Intervention    Discipline physical therapist  -RAJI      Document Type therapy note (daily note)  -RAJI      Subjective Information agree to therapy;complains of;pain  -ARJI      Patient Effort, Rehab Treatment adequate  -RAJI      Symptoms Noted During/After Treatment increased pain  -RAJI      Precautions/Limitations fall precautions  -RAJI      Recorded by [RAJI] Ana Thao PT      Vital Signs    O2 Delivery Pre Treatment supplemental O2  -RAJI      Recorded by [RAJI] Ana Thao, HAN      Pain Assessment    Pain Assessment 0-10  -RAJI      [place] FACES Pain Rating 8  -RAJI      Pain Location Hand  -RAJI      Pain Orientation Right  -RAJI      Pain Intervention(s) Repositioned;Ambulation/increased activity  -RAJI      Recorded by [RAJI] Ana Thao, AHN      Cognitive Assessment/Intervention    Current Cognitive/Communication Assessment impaired  -RAJI      Orientation Status oriented x 4  -RAJI      Follows Commands/Answers Questions 75% of the time   -RAJI      Personal Safety mild impairment;decreased awareness, need for assist;decreased awareness, need for safety  -RAJI      Personal Safety Interventions fall prevention program maintained;gait belt;nonskid shoes/slippers when out of bed;supervised activity  -RAJI      Recorded by [RAJI] Ana Thao, PT      Bed Mobility, Assessment/Treatment    Bed Mob, Supine to Sit, [place] moderate assist (50% patient effort);2 person assist required  -RAJI      Bed Mob, Sit to Supine, [place] moderate assist (50% patient effort);2 person assist required  -RAJI      Recorded by [RAJI] Ana Thao PT      Transfer Assessment/Treatment    Transfers, Sit-Stand [place] moderate assist (50% patient effort);2 person assist required  -RAJI      Transfers, Stand-Sit [place] moderate assist (50% patient effort);2 person assist required  -RAJI      Transfers, Sit-Stand-Sit, Assist Device rolling walker  -RAJI      Transfer, Safety Issues loses balance backward;weight-shifting ability decreased;step length decreased  -RJAI      Transfer, Impairments strength decreased;impaired balance  -RAJI      Transfer, Comment unable to bear weight on R Ue for RW or HHA. Unable to weight shift in order to side step and poor standing tolerance due to posterior trunk lean  -RAJI      Recorded by [RAJI] Ana Thao PT      Gait Assessment/Treatment    Gait, [place] Level unable to perform  -RAJI      Recorded by [RAJI] Ana Thao, PT      Balance Skills Training    Sitting-Level of Assistance Contact guard  -RAJI      Sitting-Balance Support Left upper extremity supported;Feet supported  -RAJI      Sitting-Balance Activities Trunk control activities  -RAJI      Sitting # of Minutes 5  -RAJI      Standing-Level of Assistance Moderate assistance;x2  -RAJI      Static Standing Balance Support assistive device;Left upper extremity supported  -RAJI      Standing-Balance Activities Weight Shift R-L;Weight Shift A-P  -RAJI      Standing Balance # of  Minutes 1   x2  -RAJI      Recorded by [RAJI] Ana Thao PT      Positioning and Restraints    Pre-Treatment Position in bed  -RAIJ      Post Treatment Position bed  -RAJI      In Bed supine;call light within reach;encouraged to call for assist;with family/caregiver  -RAJI      Recorded by [RAJI] Ana Thao, PT        User Key  (r) = Recorded By, (t) = Taken By, (c) = Cosigned By    Initials Name Effective Dates    SG Aniyah Kierra, OTR 04/13/15 -     KH Roxy Carlota, PT 12/01/15 -     RAJI Thao, PT 06/22/16 -     ADONIS Maddox 12/13/16 -                 OT Goals       07/29/17 1129          Bed Mobility OT LTG    Bed Mobility OT LTG, Date Established 07/29/17  -EB      Bed Mobility OT LTG, Time to Achieve by discharge  -EB      Bed Mobility OT LTG, Activity Type all bed mobility  -EB      Bed Mobility OT LTG, [place] Level minimum assist (75% patient effort)  -EB      Strength OT LTG    Strength Goal OT LTG, Date Established 07/29/17  -EB      Strength Goal OT LTG, Time to Achieve by discharge  -EB      Strength Goal OT LTG, Measure to Achieve PT to complete HEP for BUE strengthening to improve (I) for ADLs.   -EB      Dynamic Sitting Balance OT LTG    Dynamic Sitting Balance OT LTG, Date Established 07/29/17  -EB      Dynamic Sitting Balance OT LTG, Time to Achieve by discharge  -EB      Dynamic Sitting Balance OT LTG, [place] Level supervision required  -EB      Dynamic Sitting Balance OT LTG, Assist Device UE Support  -EB      ADL OT LTG    ADL OT LTG, Date Established 07/29/17  -EB      ADL OT LTG, Time to Achieve by discharge  -EB      ADL OT LTG, Activity Type ADL skills  -EB      ADL OT LTG, [place] Level mod assist  -EB        User Key  (r) = Recorded By, (t) = Taken By, (c) = Cosigned By    Initials Name Provider Type    EB SAILAJA French Occupational Therapist          Occupational Therapy Education     Title: PT OT SLP Therapies (Done)     Topic: Occupational Therapy  (Done)     Point: ADL training (Done)    Description: Instruct learner(s) on proper safety adaptation and remediation techniques during self care or transfers.   Instruct in proper use of assistive devices.    Learning Progress Summary    Learner Readiness Method Response Comment Documented by Status   Patient Acceptance E VU,NR  EB 07/29/17 1128 Done               Point: Home exercise program (Done)    Description: Instruct learner(s) on appropriate technique for monitoring, assisting and/or progressing therapeutic exercises/activities.    Learning Progress Summary    Learner Readiness Method Response Comment Documented by Status   Patient Acceptance E VU,NR  EB 07/29/17 1128 Done               Point: Precautions (Done)    Description: Instruct learner(s) on prescribed precautions during self-care and functional transfers.    Learning Progress Summary    Learner Readiness Method Response Comment Documented by Status   Patient Acceptance E VU,NR  EB 07/29/17 1128 Done               Point: Body mechanics (Done)    Description: Instruct learner(s) on proper positioning and spine alignment during self-care, functional mobility activities and/or exercises.    Learning Progress Summary    Learner Readiness Method Response Comment Documented by Status   Patient Acceptance E MADHAV,NR  EB 07/29/17 1128 Done                      User Key     Initials Effective Dates Name Provider Type Discipline     02/28/17 -  Alicia Nugent OTR Occupational Therapist OT                  OT Recommendation and Plan  Anticipated Discharge Disposition: skilled nursing facility  Planned Therapy Interventions: activity intolerance, ADL retraining, balance training, transfer training, strengthening, neuromuscular re-education, joint mobilization, home exercise program, bed mobility training  Therapy Frequency: 3-5 times/wk           Outcome Measures       08/01/17 1346 07/31/17 1500 07/30/17 1614    How much help from another person do you  currently need...    Turning from your back to your side while in flat bed without using bedrails?  3  -CW 3  -KH    Moving from lying on back to sitting on the side of a flat bed without bedrails?  3  -CW 2  -KH    Moving to and from a bed to a chair (including a wheelchair)?  2  -CW 2  -KH    Standing up from a chair using your arms (e.g., wheelchair, bedside chair)?  2  -CW 2  -KH    Climbing 3-5 steps with a railing?  1  -CW 1  -KH    To walk in hospital room?  1  -CW 1  -KH    AM-PAC 6 Clicks Score  12  -CW 11  -KH    How much help from another is currently needed...    Putting on and taking off regular lower body clothing? 1  -SG      Bathing (including washing, rinsing, and drying) 1  -SG      Toileting (which includes using toilet bed pan or urinal) 1  -SG      Putting on and taking off regular upper body clothing 1  -SG      Taking care of personal grooming (such as brushing teeth) 2  -SG      Eating meals 1  -SG      Score 7  -SG      Functional Assessment    Outcome Measure Options   AM-PAC 6 Clicks Basic Mobility (PT)  -KH      07/29/17 1400          How much help from another person do you currently need...    Turning from your back to your side while in flat bed without using bedrails? 2  -RAJI      Moving from lying on back to sitting on the side of a flat bed without bedrails? 2  -RAJI      Moving to and from a bed to a chair (including a wheelchair)? 2  -RAJI      Standing up from a chair using your arms (e.g., wheelchair, bedside chair)? 2  -RAJI      Climbing 3-5 steps with a railing? 1  -RAJI      To walk in hospital room? 1  -RAJI      AM-PAC 6 Clicks Score 10  -RAIJ      Functional Assessment    Outcome Measure Options AM-PAC 6 Clicks Basic Mobility (PT)  -RAJI        User Key  (r) = Recorded By, (t) = Taken By, (c) = Cosigned By    Initials Name Provider Type    SG Aniyah Lozada, OTR Occupational Therapist    ROLA Van, PT Physical Therapist    RAJI Thao, PT Physical Therapist    ADONIS  Adonay Maddox Physical Therapy Assistant           Time Calculation:         Time Calculation- OT       08/01/17 1346          Time Calculation- OT    OT Start Time 1332  -SG      OT Stop Time 1350  -SG      OT Time Calculation (min) 18 min  -SG      OT Received On 08/01/17  -        User Key  (r) = Recorded By, (t) = Taken By, (c) = Cosigned By    Initials Name Provider Type     SAILAJA Fernandez Occupational Therapist           Therapy Charges for Today     Code Description Service Date Service Provider Modifiers Qty    [id number]  OT THER PROC EA 15 MIN 8/1/2017 SAILAJA Fernandez GO 1               SAILAJA Fernandez  8/1/2017

## 2017-08-01 NOTE — PROCEDURES
Procedure Note    Adrian Thibodeaux    Diagnosis:   Left  knee effusion. History of pain. Suspected OA.    Anesthesia: Bed side procedure with local Lidocaine infiltration.     Staff: Clyde Puckett M.D.    Specimens:                Crystals, Cell count with differential, Culture and sensitivity.       Procedure:   Verbal consent was obtained. Allergies verified.   Left  knee was prepped and  using aseptic precautions,  Lidocaine 1% was infiltrated into the skin and subcutaneous tissue and capsule  to achieve local analgesia. Aspiration with an 18-gauge needle resulted in 30 cc of clear fluid .    The fluid was sent to lab .   Depo Medrol 40 mg was injected intraarticular.   Tolerated the procedure well, no complications.         Clyde Puckett MD     Date: 8/1/2017  Time: 7:55 PM

## 2017-08-01 NOTE — PLAN OF CARE
Problem: Patient Care Overview (Adult)  Goal: Plan of Care Review  Outcome: Ongoing (interventions implemented as appropriate)    08/01/17 2597   Coping/Psychosocial Response Interventions   Plan Of Care Reviewed With patient;daughter   Outcome Evaluation   Outcome Summary/Follow up Plan VSS. Up to chair with PT. Percocet for shoulder pain with relief. Tolerating TF. Safety maintained. Continue to monitor.       Goal: Adult Individualization and Mutuality  Outcome: Ongoing (interventions implemented as appropriate)  Goal: Discharge Needs Assessment  Outcome: Ongoing (interventions implemented as appropriate)    Problem: Pain, Acute (Adult)  Goal: Acceptable Pain Control/Comfort Level  Outcome: Ongoing (interventions implemented as appropriate)    Problem: Fall Risk (Adult)  Goal: Absence of Falls  Outcome: Ongoing (interventions implemented as appropriate)    Problem: Skin Integrity Impairment, Risk/Actual (Adult)  Goal: Skin Integrity/Wound Healing  Outcome: Ongoing (interventions implemented as appropriate)    Problem: Nutrition, Enteral (Adult)  Goal: Signs and Symptoms of Listed Potential Problems Will be Absent or Manageable (Nutrition, Enteral)  Outcome: Ongoing (interventions implemented as appropriate)    Problem: Pressure Ulcer Risk (Brian Scale) (Adult,Obstetrics,Pediatric)  Goal: Identify Related Risk Factors and Signs and Symptoms  Outcome: Ongoing (interventions implemented as appropriate)  Goal: Skin Integrity  Outcome: Ongoing (interventions implemented as appropriate)

## 2017-08-01 NOTE — PLAN OF CARE
Problem: Patient Care Overview (Adult)  Goal: Adult Individualization and Mutuality  Outcome: Ongoing (interventions implemented as appropriate)  Speech evaluation today-exercises given to patient and daughter worked with father today intermittently-alert and oriented-family at side -left knee still swollen but pt denies pain at knee but c/opain in neck and back and was medicated with percocet as ordered-supplies at bedside for Dr Puckett for procedure and permit is on chart -pt progressing    Problem: Fall Risk (Adult)  Goal: Absence of Falls  Outcome: Ongoing (interventions implemented as appropriate)  No falls this shift-calls out appropriately for assist    Problem: Skin Integrity Impairment, Risk/Actual (Adult)  Goal: Skin Integrity/Wound Healing  Outcome: Ongoing (interventions implemented as appropriate)  No skin breakdown noted    Problem: Nutrition, Enteral (Adult)  Goal: Signs and Symptoms of Listed Potential Problems Will be Absent or Manageable (Nutrition, Enteral)  Outcome: Ongoing (interventions implemented as appropriate)  Tolerating tube feeding without nausea or vomitting

## 2017-08-01 NOTE — CONSULTS
Consults    Patient Care Team:  Symone Ford MD as PCP - General (Family Medicine)    Chief complaint:  #1.  Encephalopathy  #2 immobility syndrome   #3 dysphagia - cortrak tube.  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  He was noted to have a decline in his functional status with history of a fall at home, fatigue, Ms., cough.  He was evaluated at one of the [facility] on July 19.  Did not require hospitalization at that time.  He went to stay at his son's home, but after a couple of days was admitted to the [facility] where his wife resides.  He was there for approximately a week but   was not making progress so his family brought him to [facility] where he was admitted.  He was noted to have acute renal insufficiency treated with IV fluids, also noted was pneumonia.  He has dysphagia.  He did not pass a swallow test on July 31.  He has a cor track tube in place.  Plan is to repeat swallow test in a couple days after he stronger.  He had significant pain at the right shoulder and underwent a steroid injection on July 28 with significant improvement.  He was noted to have chronic rotator cuff tear and shoulder arthritis.  Also noted was right thumb degenerative joint disease on imaging.  He had some left knee pain but only mild degenerative changes on x-ray.  As part of his workup he was seen by neurology and was ruled out for acute stroke.  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   He has been oriented ×4.  Bed mobility minimum assist.  Transfers moderate assist of 2.  Posterior lean.  Ambulation moderate assist of 2 couple steps with his transfer.  He uses a walker at home.  Does have a history of falls.  Was able to ambulate around his home independently up until his functional decline 2 weeks ago he would exercise almost daily with exercise bike and using weights.  Review of Systems   Comfortable with his breathing.  Right shoulder pain improved.  No bowel complaints.  No bladder complaints.  10 point review systems as per history of present illness.  Past Medical History:   Diagnosis Date   • A-fib    • Arthritis    • Chronic back pain    • Coronary artery disease    • GERD (gastroesophageal reflux disease)    • Hyperlipidemia    • Hypertension    • Kyphosis    • Mitral regurgitation    • Prostate cancer    • PSVT (paroxysmal supraventricular tachycardia)    , Spinal surgery in 2011 with inpatient rehabilitation at [facility] at that time.  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  Left vertebral artery moderate narrowing and right vertebral artery occlusion.  Right thumb degenerative joint disease.  Right shoulder chronic rotator cuff tear/arthritis.  Past Surgical History:   Procedure Laterality Date   • APPENDECTOMY     • BACK SURGERY     • CARDIAC CATHETERIZATION     • KNEE SURGERY     • PROSTATE SURGERY     • TONSILLECTOMY     ,   Family History   Problem Relation Age of Onset   • Hypertension Other    ,   Social History   Substance Use Topics   • Smoking status: Former Smoker   • Smokeless tobacco: None   • Alcohol use Yes      Comment: 1-2 drinks/day   , He patient lives in [facility] alone in a home with 1 step to enter.  Bedroom and bathroom on the first level.  She used a rolling walker.  He would exercise daily with weights and exercise bike.  Wife  resides at the [facility].   Prescriptions Prior to Admission   Medication Sig Dispense Refill Last Dose   • apixaban (ELIQUIS) 2.5 MG tablet tablet Take 2.5 mg by mouth 2 (Two) Times a Day.      • aspirin 81 MG EC tablet Take 81 mg by mouth Daily.   2017 at Unknown time   • azelastine (ASTELIN) 0.1 % nasal spray 2 sprays into each nostril Daily As Needed for Rhinitis. Use in each nostril as directed      • calcium carbonate-vitamin d ( CALCIUM CARBONATE-VITAMIN D) 600-400 MG-UNIT per tablet Take 1 tablet by mouth Daily.   2017 at Unknown time   • carvedilol (COREG) 3.125 MG tablet Take 3.125 mg by mouth 2 (Two) Times a Day With Meals.   2017 at Unknown time   • doxazosin (CARDURA) 4 MG tablet Take 4 mg by mouth Every Morning.   2017 at Unknown time   • fenofibrate (TRICOR) 145 MG tablet Take 145 mg by mouth Daily.   2017 at Unknown time   • memantine (NAMENDA) 10 MG tablet Take 10 mg by mouth Every Night.   2017 at Unknown time   • metaxalone (SKELAXIN) 800 MG tablet Take 800 mg by mouth Every 8 (Eight) Hours.   2017 at Unknown time   • Mirabegron ER (MYRBETRIQ) 25 MG tablet sustained-release 24 hour 24 hr tablet Take 25 mg by mouth Daily.   2017 at Unknown time   • multivitamin (THERAGRAN) tablet tablet Take 1 tablet by mouth Daily.   2017 at Unknown time   • Omega-3 Fatty Acids (FISH OIL) 1000 MG capsule capsule Take 1,000 mg by mouth Daily With Breakfast.   2017 at Unknown time   • polyethylene glycol (MIRALAX) packet Take 17 g by mouth Daily.   2017 at Unknown time   • [] predniSONE (DELTASONE) 10 MG tablet Take 10 mg by mouth Daily.   2017 at Unknown time   • pregabalin (LYRICA) 50 MG capsule Take 50 mg by mouth 2 (Two) Times a Day. With breakfast and lunch   2017 at Unknown time   • pregabalin (LYRICA) 75 MG capsule Take 75 mg by mouth Every Night.   2017 at Unknown time   • rivastigmine (EXELON) 1.5 MG capsule Take 1.5 mg by mouth 2  (Two) Times a Day.   7/26/2017 at Unknown time   • traMADol (ULTRAM) 50 MG tablet Take 50 mg by mouth Every 6 (Six) Hours As Needed for Moderate Pain (4-6).      , Scheduled Meds:    amoxicillin-clavulanate 500 mg Nasogastric Q12H   apixaban 2.5 mg Oral BID   aspirin 81 mg Oral Daily   atorvastatin 80 mg Oral Nightly   carvedilol 3.125 mg Oral BID With Meals   lidocaine 10 mL Infiltration Once   methylPREDNISolone acetate 40 mg Intra-articular Once   pregabalin 50 mg Oral BID   terazosin 5 mg Oral Nightly   vitamin B-12 1,000 mcg Oral Daily   , Continuous Infusions:   , PRN Meds:  •  acetaminophen  •  Morphine **AND** Naloxone HCl  •  Morphine  •  ondansetron **OR** ondansetron ODT **OR** ondansetron  •  oxyCODONE-acetaminophen  •  sennosides-docusate sodium  •  sodium chloride  •  sodium chloride  •  sodium chloride and Allergies:  Review of patient's allergies indicates no known allergies.    Objective      Vital Signs  Temp:  [97.5 °F (36.4 °C)-97.7 °F (36.5 °C)] 97.6 °F (36.4 °C)  Heart Rate:  [54-64] 57  Resp:  [18-20] 20  BP: (110-155)/(69-80) 110/71    Physical Exam  Mental status-awake and alert.   oriented to place and month but off on the year.  He could not do simple money management.  HEENT-cor track tube in place.  Dysphonia.  Sclera nonicteric.  Conjunctiva pink.  Lungs-decreased breath sounds at the bases.  No wheezes rales or rhonchi  Heart-S1, S2.  No rub murmur or gallop.  Abdomen-normoactive bowel sounds soft nontender.  No parasplenic appreciated  Extremities-no edema.  He has fairly good active range of motion at the right shoulder  Neurologic-he'll take resistance with bilateral elbow flexion, finger flexion, hand intrinsics, knee extension, ankle dorsiflexion.  Visual fields intact to finger movement bilaterally.  He has dysphonia.  Oriented to the year.  Results Review:     Results from last 7 days  Lab Units 07/30/17  0558 07/29/17  0444 07/28/17  0451   WBC 10*3/mm3 10.11 13.07* 14.68*    HEMOGLOBIN g/dL 11.5* 10.8* 11.4*   HEMATOCRIT % 35.0* 33.1* 35.5*   PLATELETS 10*3/mm3 482 414 378         Results from last 7 days  Lab Units 07/30/17  0558 07/29/17  0444 07/28/17  0451 07/27/17  0644 07/26/17  1615   SODIUM mmol/L 138 139 138 139 137   POTASSIUM mmol/L 4.1 3.9 4.0 4.1 4.5   CHLORIDE mmol/L 103 105 105 101 94*   CO2 mmol/L 23.7 23.2 23.3 22.7 29.4*   BUN mg/dL 24* 25* 28* 26* 32*   CREATININE mg/dL 0.97 1.11 1.29* 1.19 1.57*   CALCIUM mg/dL 9.0 8.9 9.1 9.0 10.8*   BILIRUBIN mg/dL  --   --   --  0.7 0.7   ALK PHOS U/L  --   --   --  57 71   ALT (SGPT) U/L  --   --   --  31 48*   AST (SGOT) U/L  --   --   --  26 44*   GLUCOSE mg/dL 132* 134* 104* 107* 120*        I reviewed the patient's new clinical results.        Assessment/Plan     Principal Problem:    Aspiration pneumonia  Active Problems:    Right shoulder pain    Paroxysmal atrial fibrillation    Oropharyngeal dysphagia    Knee effusion, left      Assessment & Plan  #1.  Encephalopathy  #2 immobility syndrome   #3 dysphagia - cortrak tube.  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  He would benefit from ongoing speech therapy, physical therapy, occupational therapy to address swallowing, cognition, functional mobility, activities of daily living, balance, transfers, and ambulation.  Anticipate that he will need assistance after discharge if pursue acute inpatient rehabilitation.  Reviewed the options of acute inpatient  rehabilitation at [facility] versus subacute options.  If adequate support can be in place after discharge, could look at pursuing acute inpatient rehabilitation at [facility] as he was able to manage at home, although with some memory deficits, up until this decline 2 weeks ago.  The other option, if adequate support is not available, is to look at subacute rehabilitation.  Would probably look at doing that at the [facility] where his wife resides.  [facility] admission liaison to see.  I discussed the patients findings and my recommendations with patient, family and nursing staff    Elias Rivera MD  08/01/17  5:35 PM    Time:

## 2017-08-01 NOTE — THERAPY TREATMENT NOTE
Acute Care - Physical Therapy Treatment Note  [facility]     Patient Name: Adrian Thibodeaux  : 1931  MRN: [id number]  Today's Date: 2017  Onset of Illness/Injury or Date of Surgery Date: 17          Admit Date: 2017    Visit Dx:    ICD-10-CM ICD-9-CM   1. Left lower quadrant pain R10.32 789.04   2. Leukocytosis, unspecified type D72.829 288.60   3. Renal insufficiency N28.9 593.9   4. Confusion R41.0 298.9   5. Generalized weakness R53.1 780.79     Patient Active Problem List   Diagnosis   • Right shoulder pain   • Paroxysmal atrial fibrillation   • Oropharyngeal dysphagia   • Aspiration pneumonia   • Knee effusion, left               Adult Rehabilitation Note       17 1600 17 1300 17 1500    Rehab Assessment/Intervention    Discipline  occupational therapist  -SG physical therapy assistant  -CW    Document Type  therapy note (daily note)  -SG therapy note (daily note)  -CW    Subjective Information  agree to therapy  -SG agree to therapy;complains of;weakness  -CW    Patient Effort, Rehab Treatment  adequate  - adequate  -CW    Precautions/Limitations  fall precautions;oxygen therapy device and L/min  -SG fall precautions;oxygen therapy device and L/min  -CW    Recorded by  [SG] SAILAJA Fernandez [CW] Adonay Maddox    Vital Signs    O2 Delivery Post Treatment   supplemental O2  -CW    Recorded by   [CW] Adonay Maddox    Pain Assessment    Pain Assessment No/denies pain  - No/denies pain  - 0-10  -CW    Pain Score   6  -CW    Post Pain Score   6  -CW    Pain Location   Shoulder  -CW    Pain Orientation   Right  -CW    Pain Intervention(s) Medication (See MAR)  - Medication (See MAR)  - Repositioned;Ambulation/increased activity  -CW    Response to Interventions long, meds helped shldr  -ALIN long, meds helped shldr  -ALIN long  -CW    Recorded by [JM] Mera Rendon PTA [JM] Mera Rendon PTA [CW] Adonay Maddox    Cognitive Assessment/Intervention     Current Cognitive/Communication Assessment  functional  -SG functional  -CW    Orientation Status  oriented x 4  -SG oriented x 4  -CW    Follows Commands/Answers Questions  100% of the time;able to follow single-step instructions  -% of the time  -CW    Personal Safety   WNL/WFL  -CW    Personal Safety Interventions   fall prevention program maintained;gait belt;muscle strengthening facilitated;nonskid shoes/slippers when out of bed  -CW    Recorded by  [SG] SAILAJA Fernandez [] Adonay Maddox    ROM (Range of Motion)    General ROM Detail  RUE remains limited with AROM  -SG     Recorded by  [SG] SAILAJA Fernandez     Bed Mobility, Assessment/Treatment    Bed Mob, Supine to Sit, [place] minimum assist (75% patient effort);verbal cues required  - minimum assist (75% patient effort)  -SG minimum assist (75% patient effort);supervision required;verbal cues required  -CW    Bed Mob, Sit to Supine, River not tested  - minimum assist (75% patient effort)  - minimum assist (75% patient effort)  -CW    Recorded by [JM] Mera Rendon PTA [SG] SAILAJA Fernandez [] Adonay Maddox    Transfer Assessment/Treatment    Transfers, Bed-Chair [place] minimum assist (75% patient effort);moderate assist (50% patient effort);2 person assist required;verbal cues required;nonverbal cues required (demo/gesture)  -      Transfers, Sit-Stand [place] 2 person assist required;minimum assist (75% patient effort);moderate assist (50% patient effort);hand held assist  -      Transfers, Stand-Sit [place] minimum assist (75% patient effort);2 person assist required  -  not tested  -CW    Transfer, Comment  shuffle steps to chair w/knees blocked  - just back to bed after sitting in chair per pt and daughter and nsg  -SG     Recorded by [JM] Mera Rendon PTA [SG] SAILAJA Fernandez [] Adonay Maddox    ADL Assessment/Intervention    Additional Documentation  --    adls limited at this time due to RUE limited function  -     Recorded by  [SG] SAILAJA Fernandez     Balance Skills Training    Sitting-Level of Assistance Close supervision;Contact guard  - Close supervision  - Close supervision  -CW    Sitting-Balance Support Feet supported  -  Feet supported  -    Sitting-Balance Activities Head control activities (Comment);Trunk control activities;Right UE Weight Bearing;Left UE Weight Bearing;Lateral lean;Forward lean  -  Trunk control activities  -    Sitting # of Minutes 15  -JM  10  -CW    Recorded by [] Mera Rendon PTA [SG] Aniyah Lozada OTR [CW] Adonay Maddox    Therapy Exercises    Bilateral Lower Extremities AROM:;10 reps;LAQ;knee flexion  -  AROM:;10 reps;sitting;ankle pumps/circles;hip flexion;LAQ  -CW    Recorded by [] Mera Rendon PTA  [] Adonay Maddox    Positioning and Restraints    Pre-Treatment Position in bed  - in bed  - in bed  -    Post Treatment Position chair  - bed  - bed  -    In Bed --   dtr to call out if needed, did not want call bell on his lap  - call light within reach;encouraged to call for assist;exit alarm on;with family/caregiver  - notified nsg;supine;call light within reach;encouraged to call for assist;exit alarm on;with family/caregiver  -    In Chair call light within reach;encouraged to call for assist;exit alarm on;with family/caregiver;notified nsg   dtr to call if needed, did not want call light on lap  -      Recorded by [] Mera Rendon PTA [] Aniyah Lozada, SAILAJA [CW] Adonay Maddox      07/30/17 1608          Rehab Assessment/Intervention    Discipline physical therapist  -      Document Type therapy note (daily note)  -ROLA      Subjective Information agree to therapy;no complaints  -      Patient Effort, Rehab Treatment good  -KH      Precautions/Limitations fall precautions;oxygen therapy device and L/min  -ROLA      Recorded by [KH] Roxy Van,  PT      Pain Assessment    Pain Assessment No/denies pain  -      Recorded by [ROLA] Roxy Van, PT      Cognitive Assessment/Intervention    Current Cognitive/Communication Assessment impaired  -      Orientation Status oriented x 4  -      Follows Commands/Answers Questions 75% of the time  -      Personal Safety mild impairment;decreased awareness, need for assist;decreased awareness, need for safety  -      Personal Safety Interventions fall prevention program maintained;gait belt;nonskid shoes/slippers when out of bed  -      Recorded by [ROLA] Roxy Van PT      Bed Mobility, Assessment/Treatment    Bed Mob, Supine to Sit, [place] minimum assist (75% patient effort);moderate assist (50% patient effort);supervision required;verbal cues required  -      Bed Mob, Sit to Supine, [place] minimum assist (75% patient effort);moderate assist (50% patient effort);2 person assist required  -      Recorded by [ROLA] Roxy Van PT      Transfer Assessment/Treatment    Transfers, Bed-Chair [place] moderate assist (50% patient effort);2 person assist required;verbal cues required;nonverbal cues required (demo/gesture)  -      Transfers, Chair-Bed [place] moderate assist (50% patient effort);2 person assist required;verbal cues required;nonverbal cues required (demo/gesture)  -      Transfers, Sit-Stand [place] moderate assist (50% patient effort);2 person assist required;verbal cues required;nonverbal cues required (demo/gesture)  -      Transfers, Stand-Sit [place] moderate assist (50% patient effort);2 person assist required;verbal cues required;nonverbal cues required (demo/gesture)  -      Transfers, Sit-Stand-Sit, Assist Device --   hand held x 2  -KH      Transfer, Safety Issues loses balance backward   posterior lean  -      Transfer, Impairments strength decreased;impaired balance  -      Transfer, Comment poor standing balance due to posterior  lean; transferred from bed to transport bed with mod A x 2.  -      Recorded by [ROLA] Roxy Van, PT      Gait Assessment/Treatment    Gait, [place] Level moderate assist (50% patient effort);maximum assist (25% patient effort);2 person assist required;verbal cues required;nonverbal cues required (demo/gesture)  -      Gait, Assistive Device --   hand held x 2  -      Gait, Distance (Feet) --   took a couple forward and side steps to transfer beds  -      Gait, Safety Issues balance decreased during turns;loses balance backward  -      Gait, Impairments strength decreased;impaired balance  -KH      Recorded by [ROLA] Roxy Van, PT      Balance Skills Training    Sitting-Level of Assistance Contact guard;Close supervision  -      Sitting-Balance Support Right upper extremity supported;Left upper extremity supported;Feet supported  -      Sitting-Balance Activities Trunk control activities  -      Standing-Level of Assistance Moderate assistance;x2  -KH      Static Standing Balance Support Right upper extremity supported;Left upper extremity supported  -      Recorded by [ROLA] Roxy Van, PT      Positioning and Restraints    Pre-Treatment Position in bed  -      Post Treatment Position bed  -      In Bed supine;with other staff   pt leaving with transport  -KH      Recorded by [ROLA] Roxy Van, PT        User Key  (r) = Recorded By, (t) = Taken By, (c) = Cosigned By    Initials Name Effective Dates    SG Aniyah Lozada, OTR 04/13/15 -     ROLA Van, PT 12/01/15 -     ALIN Rendon, PTA 02/18/16 -     CW Adonay Maddox 12/13/16 -                 IP PT Goals       07/27/17 1156          Bed Mobility PT LTG    Bed Mobility PT LTG, Time to Achieve 1 wk  -CH      Bed Mobility PT LTG, Activity Type all bed mobility  -      Bed Mobility PT LTG, [place] Level minimum assist (75% patient effort);2 person assist required  -      Transfer Training PT LTG     Transfer Training PT LTG, Time to Achieve 1 wk  -CH      Transfer Training PT LTG, Activity Type all transfers  -CH      Transfer Training PT LTG, [place] Level minimum assist (75% patient effort);2 person assist required  -CH      Transfer Training PT LTG, Assist Device walker, rolling  -CH      Gait Training PT LTG    Gait Training Goal PT LTG, Time to Achieve 1 wk  -CH      Gait Training Goal PT LTG, [place] Level minimum assist (75% patient effort);2 person assist required  -CH      Gait Training Goal PT LTG, Assist Device walker, rolling  -CH      Gait Training Goal PT LTG, Distance to Achieve 30  -CH        User Key  (r) = Recorded By, (t) = Taken By, (c) = Cosigned By    Initials Name Provider Type    DOMENICO Saeed, PT Physical Therapist          Physical Therapy Education     Title: PT OT SLP Therapies (Done)     Topic: Physical Therapy (Done)     Point: Mobility training (Done)    Learning Progress Summary    Learner Readiness Method Response Comment Documented by Status   Patient Eager TB,E,D VU,NR   08/01/17 1629 Done    Acceptance E,TB DU,VU   07/31/17 1529 Done    Acceptance E VU   07/30/17 1613 Done    Acceptance E VU Attempted to weight shift in standing but patient has difficulty unweighting one leg to take even a small step On license of [facility] 07/29/17 1422 Done    Acceptance E,TB DU,VU   07/28/17 1511 Done    Acceptance E,TB,D VU,NR   07/27/17 1155 Done   Family Eager TB,E,D VU,NR   08/01/17 1629 Done               Point: Home exercise program (Done)    Learning Progress Summary    Learner Readiness Method Response Comment Documented by Status   Patient Eager TB,E,D VU,NR   08/01/17 1629 Done    Acceptance E,TB DU,VU   07/31/17 1529 Done    Acceptance E,TB DU,VU   07/28/17 1511 Done   Family Eager TB,E,D VU,NR   08/01/17 1629 Done               Point: Body mechanics (Done)    Learning Progress Summary    Learner Readiness Method Response Comment Documented by Status   Patient  Eager TB,E,D VU,NR   08/01/17 1629 Done    Acceptance E,TB DU,VU   07/31/17 1529 Done    Acceptance E VU Attempted to weight shift in standing but patient has difficulty unweighting one leg to take even a small step [facility] 07/29/17 1422 Done    Acceptance E,TB DU,VU   07/28/17 1511 Done    Acceptance E,TB,D VU,NR   07/27/17 1155 Done   Family Eager TB,E,D VU,NR   08/01/17 1629 Done               Point: Precautions (Done)    Learning Progress Summary    Learner Readiness Method Response Comment Documented by Status   Patient Eager TB,E,D VU,NR   08/01/17 1629 Done    Acceptance E,TB DU,VU   07/31/17 1529 Done    Acceptance E,TB DU,VU   07/28/17 1511 Done    Acceptance E,TB,D VU,NR   07/27/17 1155 Done   Family Eager TB,E,D VU,NR   08/01/17 1629 Done                      User Key     Initials Effective Dates Name Provider Type Discipline     12/01/15 -  Kathia Saeed, PT Physical Therapist PT     12/01/15 -  Roxy Van, PT Physical Therapist PT     02/18/16 -  Mera Rendon, PTA Physical Therapy Assistant PT    [facility] 06/22/16 -  Ana Thao, PT Physical Therapist PT     12/13/16 -  Adonay Maddox Physical Therapy Assistant PT                    PT Recommendation and Plan  Anticipated Discharge Disposition: skilled nursing facility  Planned Therapy Interventions: balance training, bed mobility training, gait training, home exercise program, patient/family education, transfer training  PT Frequency: daily  Plan of Care Review  Plan Of Care Reviewed With: patient, daughter  Progress: improving  Outcome Summary/Follow up Plan: long tfer to chair w/o incr pain or dizziness reported          Outcome Measures       08/01/17 1600 08/01/17 1346 07/31/17 1500    How much help from another person do you currently need...    Turning from your back to your side while in flat bed without using bedrails? 3  -JM  3  -CW    Moving from lying on back to sitting on the side of a flat bed without  bedrails? 3  -JM  3  -CW    Moving to and from a bed to a chair (including a wheelchair)? 2  -JM  2  -CW    Standing up from a chair using your arms (e.g., wheelchair, bedside chair)? 2  -JM  2  -CW    Climbing 3-5 steps with a railing? 1  -JM  1  -CW    To walk in hospital room? 1  -JM  1  -CW    AM-PAC 6 Clicks Score 12  -JM  12  -CW    How much help from another is currently needed...    Putting on and taking off regular lower body clothing?  1  -SG     Bathing (including washing, rinsing, and drying)  1  -SG     Toileting (which includes using toilet bed pan or urinal)  1  -SG     Putting on and taking off regular upper body clothing  1  -SG     Taking care of personal grooming (such as brushing teeth)  2  -SG     Eating meals  1  -SG     Score  7  -SG       07/30/17 1614          How much help from another person do you currently need...    Turning from your back to your side while in flat bed without using bedrails? 3  -KH      Moving from lying on back to sitting on the side of a flat bed without bedrails? 2  -KH      Moving to and from a bed to a chair (including a wheelchair)? 2  -KH      Standing up from a chair using your arms (e.g., wheelchair, bedside chair)? 2  -KH      Climbing 3-5 steps with a railing? 1  -KH      To walk in hospital room? 1  -KH      AM-PAC 6 Clicks Score 11  -KH      Functional Assessment    Outcome Measure Options AM-PAC 6 Clicks Basic Mobility (PT)  -        User Key  (r) = Recorded By, (t) = Taken By, (c) = Cosigned By    Initials Name Provider Type    SG Aniyah Lozada, OTR Occupational Therapist    ROLA Van, PT Physical Therapist    ALIN Rendon, PTA Physical Therapy Assistant    ADONIS Maddox Physical Therapy Assistant           Time Calculation:         PT Charges       08/01/17 1118          Time Calculation    Start Time 1018  -JM      Stop Time 1045  -JM      Time Calculation (min) 27 min  -ALIN      PT Received On 08/01/17  -      PT - Next  Appointment 08/02/17  -ALIN        User Key  (r) = Recorded By, (t) = Taken By, (c) = Cosigned By    Initials Name Provider Type    ALIN Rendon PTA Physical Therapy Assistant          Therapy Charges for Today     Code Description Service Date Service Provider Modifiers Qty    57346462019 HC PT THER PROC EA 15 MIN 8/1/2017 Mera Rendon PTA GP 2    85506642641 HC PT THER SUPP EA 15 MIN 8/1/2017 Mera Rendon PTA GP 2          PT G-Codes  Outcome Measure Options: AM-PAC 6 Clicks Basic Mobility (PT)    Mera Rendon PTA  8/1/2017

## 2017-08-01 NOTE — PLAN OF CARE
Problem: Patient Care Overview (Adult)  Goal: Plan of Care Review  Outcome: Ongoing (interventions implemented as appropriate)    08/01/17 0457   Coping/Psychosocial Response Interventions   Plan Of Care Reviewed With patient   Patient Care Overview   Progress improving   Outcome Evaluation   Outcome Summary/Follow up Plan CONTINUE TO MONITOR VS, LABS, PAIN, TURN Q2, BED ALARM, SWALLOW EVAL ON WED., SAFETY       Goal: Adult Individualization and Mutuality  Outcome: Ongoing (interventions implemented as appropriate)    08/01/17 0457   Individualization   Patient Specific Interventions MONITOR FOR PAIN RELIEF; TURN Q2         Problem: Pain, Acute (Adult)  Goal: Identify Related Risk Factors and Signs and Symptoms  Outcome: Outcome(s) achieved Date Met:  08/01/17 08/01/17 0457   Pain, Acute   Related Risk Factors (Acute Pain) persistent pain;positioning   Signs and Symptoms (Acute Pain) alteration in muscle tone;guarding/abnormal posturing/positioning       Goal: Acceptable Pain Control/Comfort Level  Outcome: Ongoing (interventions implemented as appropriate)    08/01/17 0457   Pain, Acute (Adult)   Acceptable Pain Control/Comfort Level making progress toward outcome         Problem: Fall Risk (Adult)  Goal: Absence of Falls  Outcome: Ongoing (interventions implemented as appropriate)    08/01/17 0457   Fall Risk (Adult)   Absence of Falls making progress toward outcome         Problem: Skin Integrity Impairment, Risk/Actual (Adult)  Goal: Skin Integrity/Wound Healing  Outcome: Ongoing (interventions implemented as appropriate)    08/01/17 0457   Skin Integrity Impairment, Risk/Actual (Adult)   Skin Integrity/Wound Healing making progress toward outcome         Problem: Nutrition, Enteral (Adult)  Goal: Signs and Symptoms of Listed Potential Problems Will be Absent or Manageable (Nutrition, Enteral)  Outcome: Ongoing (interventions implemented as appropriate)    08/01/17 0457   Nutrition, Enteral   Problems  Assessed (Enteral Nutrition) all   Problems Present (Enteral Nutrition) aspiration

## 2017-08-01 NOTE — PROGRESS NOTES
Orthopedic Progress Note      Patient: Adrian Thibodeaux  YOB: 1931     Date of Admission: 7/26/2017  3:49 PM Medical Record Number: [id number]     Attending Physician: Dhara Morris MD    Subjective : No new orthopaedic complaints , left knee still swollen and painful    Pain Relief: some relief with present medication.     Systemic Complaints: No Complaints  Vitals:    07/31/17 1920 07/31/17 2348 08/01/17 0703 08/01/17 1307   BP: 122/69 155/80 122/78 110/71   BP Location: Left arm Left arm Left arm Right arm   Patient Position: Lying Lying Lying Lying   Pulse: 54 59 64 57   Resp: 18 18 20 20   Temp: 97.5 °F (36.4 °C) 97.6 °F (36.4 °C) 97.7 °F (36.5 °C) 97.6 °F (36.4 °C)   TempSrc: Oral Oral Oral Oral   SpO2: 99% 99% 98% 98%   Weight:       Height:           Physical Exam: 85 y.o. male    General Appearance:       Alert, cooperative, in no acute distress                  Extremities:   Left Knee positive effusion         No clinical sign of DVT        Able to do good movements of digits    Pulses:   Pulses palpable and equal bilaterally           Diagnostic Tests:       Results from last 7 days  Lab Units 07/30/17  0558 07/29/17 0444 07/28/17  0451   WBC 10*3/mm3 10.11 13.07* 14.68*   HEMOGLOBIN g/dL 11.5* 10.8* 11.4*   HEMATOCRIT % 35.0* 33.1* 35.5*   PLATELETS 10*3/mm3 482 414 378       Results from last 7 days  Lab Units 07/30/17  0558 07/29/17 0444 07/28/17  0451   SODIUM mmol/L 138 139 138   POTASSIUM mmol/L 4.1 3.9 4.0   CHLORIDE mmol/L 103 105 105   CO2 mmol/L 23.7 23.2 23.3   BUN mg/dL 24* 25* 28*   CREATININE mg/dL 0.97 1.11 1.29*   GLUCOSE mg/dL 132* 134* 104*   CALCIUM mg/dL 9.0 8.9 9.1         No results found for: CRYSTAL]  No results found for: CULTURE]  Uric Acid   Date Value Ref Range Status   07/30/2017 2.6 (L) 3.4 - 7.0 mg/dL Final   ]  Ct Abdomen Pelvis Without Contrast    Result Date: 7/26/2017  Narrative: CT SCAN OF THE ABDOMEN AND PELVIS WITHOUT CONTRAST  HISTORY: Left-sided  abdominal pain.  FINDINGS:  The CT scan was performed as an emergency procedure through the abdomen and pelvis without contrast and demonstrates the followin. There are severe changes of COPD at the lung bases with some linear fibrotic scarring as well as some parenchymal and pleural reaction at the right base. There is also a tiny right pleural effusion. 2. The liver, spleen, pancreas, both adrenal glands, and both kidneys are unremarkable without intravenous contrast. There is a 4 mm nonobstructing stone in the right kidney. 3. There may be a very small amount of dense sludge or tiny gallstones within the gallbladder. No definite wall thickening is seen by CT scan. 4. There is prominent calcification of the abdominal aorta with borderline aneurysmal enlargement of the infrarenal aorta measuring 2.3 cm. There is no retroperitoneal lymphadenopathy. The large and small bowel loops are normal in caliber and show no inflammatory change. There is a moderate amount of semisolid stool extending into the rectosigmoid colon. The pelvis is otherwise unremarkable.  This report was finalized on 2017 6:46 PM by Dr. Justin Moeller MD.      Ct Angiogram Head With & Without Contrast    Result Date: 2017  Narrative: CONTRAST ENHANCED CT ANGIOGRAM OF THE HEAD AND NECK 2017  CLINICAL HISTORY: Patient had an MRA of the head and neck yesterday, 2017 at 11:29 AM, that demonstrated absent flow signal in the right vertebral artery. This exam is performed to evaluate patency of right vertebral artery.  TECHNIQUE: Spiral CT images were obtained from the base of the skull to the vertex both pre and postintravenous contrast and images were reformatted and submitted in 3 mm thick axial CT sections with brain algorithm and spiral CT images were obtained from the top of the aortic arch up through the great vessels of the head and neck during the arterial phase of contrast and images were reformatted and submitted in 1  mm thick axial CT sections, 1 mm thick sagittal and coronal reconstructions and 3D reconstructions were performed to complete the CT angiogram of the head and neck.  This is correlated to yesterday's MRI of the brain and MRA of the head and neck 07/27/2017 at 11:00 AM.  FINDINGS: There is some mild patchy low-density in the periventricular white matter of the cerebral hemispheres consistent with mild small vessel disease and there is a 12 x 10 mm old posterior lateral left parietal white matter subcortical infarct. The remainder of the brain parenchyma is normal in attenuation. There is mild diffuse cerebral atrophy and the lateral and third ventricles are mildly prominent in size felt to be secondary to central volume loss or atrophy. I see no mass effect and no midline shift and no extra-axial fluid collections are identified and no abnormal areas of enhancement are seen in the head. There is mild right ethmoid and maxillary sinus mucosal thickening. The remainder of the paranasal sinuses and mastoid air cells and middle ear cavities are clear. There is a nasogastric tube extending down the right nasal cavity and down the pharynx into the esophagus. The nasopharynx, oropharynx, hypopharynx, true cords and subglottic airway are normal in appearance. The thyroid gland is normal in appearance. The lung apices are clear. The parotid, , parapharyngeal and submandibular spaces are symmetric and are normal in appearance. Cervical spondylosis is present with severe arthritic change at the atlantodental interval. There is mild-to-moderate right and moderate left facet overgrowth at C2-3 but no canal or foraminal narrowing at C2-3. There is mild-to-moderate left and severe right facet overgrowth at C3-4. There is posterior endplate spurring and uncovertebral joint hypertrophy and there is mild-to-moderate bilateral foraminal narrowing at C3-4. At C4-5, there are severe right and mild left facet overgrowth, some  uncovertebral joint hypertrophy and there is moderate to severe right bony foraminal narrowing, no central canal or left foraminal narrowing. At C5-6, there is mild-to-moderate left and moderate right facet overgrowth and a 3 mm anterolisthesis of C5 on C6. There is mild canal and mild-to-moderate bilateral foraminal narrowing at C6-7. There is moderate bilateral facet overgrowth, a 3 mm anterolisthesis of C6 on C7, posterior endplate spurring and there is mild canal and left foraminal narrowing and moderate right foraminal narrowing.  CT angiographic images through the neck demonstrate anatomic origin of the great vessels off the aortic arch. There are calcified plaques in the aortic arch, calcified plaque minimally narrows the left subclavian artery origin otherwise left subclavian artery is widely patent without stenosis. There is a calcified plaque involving the origin moderately narrowing the origin of the left vertebral artery and beyond this plaque the left vertebral artery appears to be widely patent to the vertebrobasilar junction without additional stenosis. The left common carotid origin is normal in appearance and no stenosis is seen in the left common carotid artery there is calcified plaque involving the left common carotid bifurcation extending into the origin and proximal aspect of the left internal carotid artery. There is essentially no stenosis in the left internal carotid artery using the NASCET criteria. The brachiocephalic artery origin is normal in appearance and no stenosis in the brachiocephalic artery to its bifurcation. There is calcified plaque involving the origin of the right subclavian artery mildly narrowing the right subclavian artery origin. The remainder of the right subclavian artery is normal in appearance. 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 There is no stenosis in the right common carotid artery. There is calcified plaque that involves the proximal right internal carotid artery results in essentially no stenosis in the right internal carotid artery using the NASCET criteria.   CT angiographic images through the head demonstrate a tiny size right vertebral artery larger but still small size left vertebral artery. The left vertebral artery is widely patent without stenosis. There is a small diameter basilar artery. There is a hypoplastic P1 segment left posterior cerebral artery, a dominant left posterior communicating artery and more well-developed right P1 segment posterior cerebral and superior cerebellar arteries are within normal limits. The upper cervical, petrous segments of the internal carotid arteries are normal. There are calcified plaques mildly narrowing the cavernous internal carotids. The supracavernous segments are widely patent. The anterior and middle cerebral arteries are normal in appearance. The anterior communicating artery origin and middle cerebral artery trifurcations are normal in appearance.      Impression: 1. There is mild small vessel disease in the cerebral white matter and 12 x 10 mm old posterior lateral left parietal subcortical white matter infarct and mild diffuse cerebral atrophy and mild prominence in size of lateral and third ventricles felt to be secondary to central volume loss. 2. There is mixed calcified and noncalcified plaque that moderately narrows the left vertebral artery origin but beyond the stenosis the remainder of the dominant left vertebral artery is widely patent without additional  stenosis to the vertebrobasilar junction. 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 No additional stenosis is seen in the great vessels of the head or neck. 3. There is some diffuse cervical spondylosis as described in great detail above. Nasogastric tube courses into the esophagus its distal tip is not evaluated on this exam.  This report was finalized on 7/28/2017 2:58 PM by Dr. Phil Hilario MD.      Xr Shoulder 2+ View Right    Result Date: 7/26/2017  Narrative: TWO-VIEW RIGHT SHOULDER  HISTORY: Shoulder pain.  There are minor degenerative changes at the AC joint and at the glenohumeral joint including some slight marginal spurring at the inferior margin of the glenoid labrum.  This report was finalized on 7/26/2017 6:26 PM by Dr. Justin Moeller MD.      Xr Wrist 3+ View Right    Result Date: 7/27/2017  Narrative: THREE-VIEW RIGHT WRIST  HISTORY: Wrist pain medially.  There are very severe degenerative changes at the base of the thumb and to a lesser extent at the articulation of the navicular bone with the multangular bones. There is also degenerative change at the radiocarpal joint. 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  This report was finalized on 7/27/2017 7:49 PM by Dr. Justin Moeller MD.      Xr Knee 3 View Left    Result Date: 7/30/2017  Narrative: LEFT KNEE 3 VIEWS.  HISTORY: Left knee swelling.  COMPARISON: No prior studies for comparison.  FINDINGS: There is no fracture or dislocation.  There are mild changes of osteoarthritis involving the knee joint.  Soft tissue swelling around the knee.      Impression:  No acute fracture or dislocation.     This report was finalized on 7/30/2017 9:02 PM by Dr. Bryan Rodriguez MD.      Ct Head Without Contrast    Result Date: 7/26/2017  Narrative: CT SCAN OF THE BRAIN WITHOUT CONTRAST  HISTORY: Confusion.  FINDINGS:  The CT scan was performed as an emergency procedure through the brain without contrast. There is prominent diffuse atrophy and chronic small vessel ischemic change consistent with the patient's age. There is no evidence of intracranial hemorrhage or mass effect. There is some minimal polypoid mucosal thickening near the floor of the right maxillary sinus.  This report was finalized on 7/26/2017 6:46 PM by Dr. Justin Moeller MD.      Ct Angiogram Neck With & Without Contrast    Result Date: 7/28/2017  Narrative: CONTRAST ENHANCED CT ANGIOGRAM OF THE HEAD AND NECK 07/28/2017  CLINICAL HISTORY: Patient had an MRA of the head and neck yesterday, 07/27/2017 at 11:29 AM, that demonstrated absent flow signal in the right vertebral artery. This exam is performed to evaluate patency of right vertebral artery.  TECHNIQUE: Spiral CT images were obtained from the base of the skull to the vertex both pre and postintravenous contrast and images were reformatted and submitted in 3 mm thick axial CT sections with brain algorithm and spiral CT images were obtained from the top of the aortic arch up through the great vessels of the head and neck during the arterial phase of contrast and images were reformatted and submitted in 1 mm thick axial CT sections, 1 mm thick sagittal and coronal reconstructions and 3D reconstructions were performed to complete the CT angiogram of the head and neck.  This is correlated to yesterday's MRI of the brain and MRA of the head and neck 07/27/2017 at 11:00 AM.  FINDINGS: There is some mild patchy low-density in the periventricular white matter of the cerebral hemispheres consistent with mild small vessel disease and there is a 12 x 10 mm old posterior  lateral left parietal white matter subcortical infarct. The remainder of the brain parenchyma is normal in attenuation. There is mild diffuse cerebral atrophy and the lateral and third ventricles are mildly prominent in size felt to be secondary to central volume loss or atrophy. I see no mass effect and no midline shift and no extra-axial fluid collections are identified and no abnormal areas of enhancement are seen in the head. There is mild right ethmoid and maxillary sinus mucosal thickening. The remainder of the paranasal sinuses and mastoid air cells and middle ear cavities are clear. There is a nasogastric tube extending down the right nasal cavity and down the pharynx into the esophagus. The nasopharynx, oropharynx, hypopharynx, true cords and subglottic airway are normal in appearance. The thyroid gland is normal in appearance. The lung apices are clear. The parotid, , parapharyngeal and submandibular spaces are symmetric and are normal in appearance. Cervical spondylosis is present with severe arthritic change at the atlantodental interval. There is mild-to-moderate right and moderate left facet overgrowth at C2-3 but no canal or foraminal narrowing at C2-3. There is mild-to-moderate left and severe right facet overgrowth at C3-4. There is posterior endplate spurring and uncovertebral joint hypertrophy and there is mild-to-moderate bilateral foraminal narrowing at C3-4. At C4-5, there are severe right and mild left facet overgrowth, some uncovertebral joint hypertrophy and there is moderate to severe right bony foraminal narrowing, no central canal or left foraminal narrowing. At C5-6, there is mild-to-moderate left and moderate right facet overgrowth and a 3 mm anterolisthesis of C5 on C6. There is mild canal and mild-to-moderate bilateral foraminal narrowing at C6-7. There is moderate bilateral facet overgrowth, a 3 mm anterolisthesis of C6 on C7, posterior endplate spurring and there is mild  canal and left foraminal narrowing and moderate right foraminal narrowing.  CT angiographic images through the neck demonstrate anatomic origin of the great vessels off the aortic arch. There are calcified plaques in the aortic arch, calcified plaque minimally narrows the left subclavian artery origin otherwise left subclavian artery is widely patent without stenosis. There is a calcified plaque involving the origin moderately narrowing the origin of the left vertebral artery and beyond this plaque the left vertebral artery appears to be widely patent to the vertebrobasilar junction without additional stenosis. The left common carotid origin is normal in appearance and no stenosis is seen in the left common carotid artery there is calcified plaque involving the left common carotid bifurcation extending into the origin and proximal aspect of the left internal carotid artery. There is essentially no stenosis in the left internal carotid artery using the NASCET criteria. The brachiocephalic artery origin is normal in appearance and no stenosis in the brachiocephalic artery to its bifurcation. There is calcified plaque involving the origin of the right subclavian artery mildly narrowing the right subclavian artery origin. The remainder of the right subclavian artery is normal in appearance. 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 There is no stenosis in the right common carotid artery. There is calcified plaque that involves the proximal right internal carotid artery  results in essentially no stenosis in the right internal carotid artery using the NASCET criteria.   CT angiographic images through the head demonstrate a tiny size right vertebral artery larger but still small size left vertebral artery. The left vertebral artery is widely patent without stenosis. There is a small diameter basilar artery. There is a hypoplastic P1 segment left posterior cerebral artery, a dominant left posterior communicating artery and more well-developed right P1 segment posterior cerebral and superior cerebellar arteries are within normal limits. The upper cervical, petrous segments of the internal carotid arteries are normal. There are calcified plaques mildly narrowing the cavernous internal carotids. The supracavernous segments are widely patent. The anterior and middle cerebral arteries are normal in appearance. The anterior communicating artery origin and middle cerebral artery trifurcations are normal in appearance.      Impression: 1. There is mild small vessel disease in the cerebral white matter and 12 x 10 mm old posterior lateral left parietal subcortical white matter infarct and mild diffuse cerebral atrophy and mild prominence in size of lateral and third ventricles felt to be secondary to central volume loss. 2. There is mixed calcified and noncalcified plaque that moderately narrows the left vertebral artery origin but beyond the stenosis the remainder of the dominant left vertebral artery is widely patent without additional stenosis to the vertebrobasilar junction. 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 No additional stenosis is seen in the great vessels of the head or  neck. 3. There is some diffuse cervical spondylosis as described in great detail above. Nasogastric tube courses into the esophagus its distal tip is not evaluated on this exam.  This report was finalized on 7/28/2017 2:58 PM by Dr. Phil Hilario MD.      Mri Angiogram Head Without Contrast    Result Date: 7/28/2017  Narrative: MRI EXAMINATION OF THE BRAIN WITH AND WITHOUT CONTRAST, MRI OF THE HEAD WITHOUT CONTRAST, MRA OF THE NECK WITH AND WITHOUT CONTRAST  HISTORY: Stroke, prostate cancer.  COMPARISON: CT head 07/26/2017.  MRI EXAMINATION OF THE BRAIN WITH AND WITHOUT CONTRAST: There is moderate diffuse atrophy. Scattered small foci of increased signal intensity noted involving the white matter of the cerebral hemispheres bilaterally on the axial T2 FLAIR sequence. Although nonspecific, the findings suggest mild small vessel ischemic disease. After contrast administration, there was no evidence of abnormal enhancement.      Impression: Mild small vessel ischemic disease with no evidence of acute infarction, mass or of abnormal enhancement. There is moderate diffuse cerebral atrophy. There is no evidence of mass or mass effect. After contrast administration, there was no evidence of abnormal enhancement.   MRA OF THE HEAD WITHOUT CONTRAST: There is signal present within the distal aspect of the vertebral arteries bilaterally with the left vertebral artery being larger than that of the right. However, there is absent signal in the right vertebral artery at the skull base. This may be secondary to slow flow or occlusion. The basilar artery is of normal caliber. There is a fetal origin of the left posterior cerebral artery. There is a suggestion of a moderate size posterior communicating artery on the right but with attenuated signal. This may be artifactual.  I could not exclude stenosis. The right P1 segment is mildly hypoplastic. There is signal present within the distal aspect of the internal carotid arteries  bilaterally. The proximal aspects of the anterior and middle cerebral arteries appear unremarkable.  IMPRESSION: Attenuated signal in the right vertebral artery at the skull base. This suggests slow flow or occlusion. There is also attenuated signal within a large posterior communicating artery on the right. Further evaluation with a CT angiogram of the neck and head is recommended in order to assess the right vertebral artery and the patency of the large posterior communicating artery on the right. The right P1 segment is mildly hypoplastic.   MRA OF THE NECK WITH AND WITHOUT CONTRAST: The origins of the great vessels are arranged in a classic configuration. The study is hampered somewhat by patient motion but there is a 0% stenosis involving the carotid bifurcations using NASCET criteria. There is mild irregularity. The right vertebral artery is dominant. There is absent signal in the right vertebral artery proximally and distally. The right vertebral artery is hypoplastic. It may be occluded or severely stenotic proximally. There is moderate tortuosity involving the origin of the dominant left vertebral artery with signal loss at its origin. I cannot exclude an ostial stenosis at least moderate in severity.  IMPRESSION: Irregularity but no significant stenosis involving the carotid bifurcations using NASCET criteria. Absent signal throughout the majority of the right vertebral artery. There is also ostial signal loss involving the left vertebral artery at its origin. Further evaluation with a CT angiogram of the neck and head is recommended to further assess the vertebral arteries as well as the patency of the large posterior communicating artery on the right.  This report was finalized on 7/28/2017 1:20 PM by Dr. Philippe Mckeon MD.      Mri Angiogram Neck With & Without Contrast    Result Date: 7/28/2017  Narrative: MRI EXAMINATION OF THE BRAIN WITH AND WITHOUT CONTRAST, MRI OF THE HEAD WITHOUT CONTRAST, MRA OF  THE NECK WITH AND WITHOUT CONTRAST  HISTORY: Stroke, prostate cancer.  COMPARISON: CT head 07/26/2017.  MRI EXAMINATION OF THE BRAIN WITH AND WITHOUT CONTRAST: There is moderate diffuse atrophy. Scattered small foci of increased signal intensity noted involving the white matter of the cerebral hemispheres bilaterally on the axial T2 FLAIR sequence. Although nonspecific, the findings suggest mild small vessel ischemic disease. After contrast administration, there was no evidence of abnormal enhancement.      Impression: Mild small vessel ischemic disease with no evidence of acute infarction, mass or of abnormal enhancement. There is moderate diffuse cerebral atrophy. There is no evidence of mass or mass effect. After contrast administration, there was no evidence of abnormal enhancement.   MRA OF THE HEAD WITHOUT CONTRAST: There is signal present within the distal aspect of the vertebral arteries bilaterally with the left vertebral artery being larger than that of the right. However, there is absent signal in the right vertebral artery at the skull base. This may be secondary to slow flow or occlusion. The basilar artery is of normal caliber. There is a fetal origin of the left posterior cerebral artery. There is a suggestion of a moderate size posterior communicating artery on the right but with attenuated signal. This may be artifactual.  I could not exclude stenosis. The right P1 segment is mildly hypoplastic. There is signal present within the distal aspect of the internal carotid arteries bilaterally. The proximal aspects of the anterior and middle cerebral arteries appear unremarkable.  IMPRESSION: Attenuated signal in the right vertebral artery at the skull base. This suggests slow flow or occlusion. There is also attenuated signal within a large posterior communicating artery on the right. Further evaluation with a CT angiogram of the neck and head is recommended in order to assess the right vertebral artery  and the patency of the large posterior communicating artery on the right. The right P1 segment is mildly hypoplastic.   MRA OF THE NECK WITH AND WITHOUT CONTRAST: The origins of the great vessels are arranged in a classic configuration. The study is hampered somewhat by patient motion but there is a 0% stenosis involving the carotid bifurcations using NASCET criteria. There is mild irregularity. The right vertebral artery is dominant. There is absent signal in the right vertebral artery proximally and distally. The right vertebral artery is hypoplastic. It may be occluded or severely stenotic proximally. There is moderate tortuosity involving the origin of the dominant left vertebral artery with signal loss at its origin. I cannot exclude an ostial stenosis at least moderate in severity.  IMPRESSION: Irregularity but no significant stenosis involving the carotid bifurcations using NASCET criteria. Absent signal throughout the majority of the right vertebral artery. There is also ostial signal loss involving the left vertebral artery at its origin. Further evaluation with a CT angiogram of the neck and head is recommended to further assess the vertebral arteries as well as the patency of the large posterior communicating artery on the right.  This report was finalized on 7/28/2017 1:20 PM by Dr. Philippe Mckeon MD.      Mri Brain With & Without Contrast    Result Date: 7/28/2017  Narrative: MRI EXAMINATION OF THE BRAIN WITH AND WITHOUT CONTRAST, MRI OF THE HEAD WITHOUT CONTRAST, MRA OF THE NECK WITH AND WITHOUT CONTRAST  HISTORY: Stroke, prostate cancer.  COMPARISON: CT head 07/26/2017.  MRI EXAMINATION OF THE BRAIN WITH AND WITHOUT CONTRAST: There is moderate diffuse atrophy. Scattered small foci of increased signal intensity noted involving the white matter of the cerebral hemispheres bilaterally on the axial T2 FLAIR sequence. Although nonspecific, the findings suggest mild small vessel ischemic disease. After  contrast administration, there was no evidence of abnormal enhancement.      Impression: Mild small vessel ischemic disease with no evidence of acute infarction, mass or of abnormal enhancement. There is moderate diffuse cerebral atrophy. There is no evidence of mass or mass effect. After contrast administration, there was no evidence of abnormal enhancement.   MRA OF THE HEAD WITHOUT CONTRAST: There is signal present within the distal aspect of the vertebral arteries bilaterally with the left vertebral artery being larger than that of the right. However, there is absent signal in the right vertebral artery at the skull base. This may be secondary to slow flow or occlusion. The basilar artery is of normal caliber. There is a fetal origin of the left posterior cerebral artery. There is a suggestion of a moderate size posterior communicating artery on the right but with attenuated signal. This may be artifactual.  I could not exclude stenosis. The right P1 segment is mildly hypoplastic. There is signal present within the distal aspect of the internal carotid arteries bilaterally. The proximal aspects of the anterior and middle cerebral arteries appear unremarkable.  IMPRESSION: Attenuated signal in the right vertebral artery at the skull base. This suggests slow flow or occlusion. There is also attenuated signal within a large posterior communicating artery on the right. Further evaluation with a CT angiogram of the neck and head is recommended in order to assess the right vertebral artery and the patency of the large posterior communicating artery on the right. The right P1 segment is mildly hypoplastic.   MRA OF THE NECK WITH AND WITHOUT CONTRAST: The origins of the great vessels are arranged in a classic configuration. The study is hampered somewhat by patient motion but there is a 0% stenosis involving the carotid bifurcations using NASCET criteria. There is mild irregularity. The right vertebral artery is  dominant. There is absent signal in the right vertebral artery proximally and distally. The right vertebral artery is hypoplastic. It may be occluded or severely stenotic proximally. There is moderate tortuosity involving the origin of the dominant left vertebral artery with signal loss at its origin. I cannot exclude an ostial stenosis at least moderate in severity.  IMPRESSION: Irregularity but no significant stenosis involving the carotid bifurcations using NASCET criteria. Absent signal throughout the majority of the right vertebral artery. There is also ostial signal loss involving the left vertebral artery at its origin. Further evaluation with a CT angiogram of the neck and head is recommended to further assess the vertebral arteries as well as the patency of the large posterior communicating artery on the right.  This report was finalized on 7/28/2017 1:20 PM by Dr. Philippe Mckeon MD.      Fl Video Swallow With Speech    Result Date: 7/28/2017  Narrative: FL VIDEO SWALLOW W SPEECH-  Clinical: Dysphasia  4 minutes 59 seconds fluoroscopy time  FINDINGS: Patient demonstrates a moderate to severe dysphagia characterized by aspiration of thin, mixed, and puree with fatigue. Inconsistent trace penetration of nectar thick also observed. Premature spillage to valleculae and pyriforms with mistiming and delay of up to 5 seconds. Reduced epiglottic deflection contributed to lack of airway protection. Patient sensed aspiration but was unable to clear once passed below level of the cords. Puree and mixed consistencies with moderate level of residue in pharynx, mild with thin and nectar.  Refer to full speech pathology evaluation.  This report was finalized on 7/28/2017 8:35 AM by Dr. Cheo Parks MD.      Xr Chest 1 View    Result Date: 7/26/2017  Narrative: 1 VIEW PORTABLE CHEST  HISTORY: Previous prostate cancer. Fatigue. Hypertension.  FINDINGS: The lungs are moderately expanded and grossly clear. Allowing for the  lung expansion, the heart size is normal and no acute abnormality is seen. .  This report was finalized on 7/26/2017 6:26 PM by Dr. Justin Moeller MD.      Xr Chest Pa & Lateral    Result Date: 7/27/2017  Narrative: TWO-VIEW CHEST  HISTORY: Previous prostate cancer. Hypertension. Fatigue.  The lungs are moderately expanded and clear. There is mild cardiomegaly unchanged from yesterday's exam. No acute abnormality is seen.  There is osteoporosis with severe compression of a lower thoracic vertebral body of uncertain age.  This report was finalized on 7/27/2017 7:49 PM by Dr. Justin Moeller MD.          Current Medications:  Scheduled Meds:  amoxicillin-clavulanate 500 mg Nasogastric Q12H   apixaban 2.5 mg Oral BID   aspirin 81 mg Oral Daily   atorvastatin 80 mg Oral Nightly   carvedilol 3.125 mg Oral BID With Meals   lidocaine 10 mL Infiltration Once   methylPREDNISolone acetate 40 mg Intra-articular Once   pregabalin 50 mg Oral BID   terazosin 5 mg Oral Nightly   vitamin B-12 1,000 mcg Oral Daily     Continuous Infusions:   PRN Meds:.•  acetaminophen  •  Morphine **AND** Naloxone HCl  •  Morphine  •  ondansetron **OR** ondansetron ODT **OR** ondansetron  •  oxyCODONE-acetaminophen  •  sennosides-docusate sodium  •  sodium chloride  •  sodium chloride  •  sodium chloride    Assessment: Left knee effusion, OA / chondrocalcinosis  Patient Active Problem List   Diagnosis   • Right shoulder pain   • Paroxysmal atrial fibrillation   • Oropharyngeal dysphagia   • Aspiration pneumonia   • Knee effusion, left       PLAN:   Aspiration and injection left knee  Please see separate procedure note    Clyde Puckett MD    Date: 8/1/2017    Time: 7:50 PM

## 2017-08-01 NOTE — PROGRESS NOTES
"     LOS: 5 days   Primary Care Physician: Symone Ford MD     Subjective   I saw the patient at 8:00 this morning.  He said he was feeling better same.  Recommendations from speech therapy noted-patient still not strong enough for oral intake.  Cor Trak remains.  His left knee was a little bit sore this morning    Vital Signs  Body mass index is 26.03 kg/(m^2).  Temp:  [97.5 °F (36.4 °C)-97.7 °F (36.5 °C)] 97.6 °F (36.4 °C)  Heart Rate:  [54-64] 57  Resp:  [18-20] 20  BP: (110-155)/(69-80) 110/71      Objective:  General Appearance:  In no acute distress.    Vital signs: (most recent): Blood pressure 110/71, pulse 57, temperature 97.6 °F (36.4 °C), temperature source Oral, resp. rate 20, height 67\" (170.2 cm), weight 166 lb 3.2 oz (75.4 kg), SpO2 98 %.    HEENT: (Neck supple.  No lymphadenopathy.  No JVD.  Trachea midline)    Lungs:  There are decreased breath sounds.  No wheezes, rales or rhonchi.    Heart: Normal rate.  Regular rhythm.  No murmur. (Occasional ectopy)  Abdomen: Abdomen is soft and non-distended.  Bowel sounds are normal.   There is no abdominal tenderness.   There is no splenomegaly. There is no hepatomegaly.   Extremities: There is no dependent edema.  (Left knee larger than right.  Nontender to palpation)  Neurological: Patient is alert.          Results Review:    I reviewed the patient's new clinical results.      Results from last 7 days  Lab Units 07/30/17  0558 07/29/17  0444   WBC 10*3/mm3 10.11 13.07*   HEMOGLOBIN g/dL 11.5* 10.8*   PLATELETS 10*3/mm3 482 414       Results from last 7 days  Lab Units 07/30/17  0558 07/29/17  0444   SODIUM mmol/L 138 139   POTASSIUM mmol/L 4.1 3.9   CHLORIDE mmol/L 103 105   CO2 mmol/L 23.7 23.2   BUN mg/dL 24* 25*   CREATININE mg/dL 0.97 1.11   CALCIUM mg/dL 9.0 8.9   GLUCOSE mg/dL 132* 134*         Hemoglobin A1C:  Lab Results   Component Value Date    HGBA1C 5.00 07/27/2017       Glucose Range:  Glucose   Date/Time Value Ref Range Status "   07/30/2017 1111 94 70 - 130 mg/dL Final   07/30/2017 0618 115 70 - 130 mg/dL Final   07/29/2017 2030 113 70 - 130 mg/dL Final       Lab Results   Component Value Date    PBHEBCBA74 417 07/28/2017       Lab Results   Component Value Date    TSH 0.957 07/27/2017       Assessment & Plan      Medication Review: Yes    Active Hospital Problems (** Indicates Principal Problem)    Diagnosis Date Noted   • **Aspiration pneumonia [J69.0] 07/30/2017   • Knee effusion, left [M25.462] 07/31/2017   • Right shoulder pain [M25.511] 07/27/2017   • Paroxysmal atrial fibrillation [I48.0] 07/27/2017   • Oropharyngeal dysphagia [R13.12] 07/27/2017      Resolved Hospital Problems    Diagnosis Date Noted Date Resolved   • Confusion [R41.0] 07/27/2017 07/31/2017   • Leukocytosis [D72.829] 07/27/2017 07/31/2017   • JARRED (acute kidney injury) [N17.9] 07/26/2017 07/30/2017   • Left lower quadrant pain [R10.32] 07/26/2017 07/31/2017       Assessment/Plan  1.  Probable aspiration pneumonia with severe oropharyngeal dysphagia.  Will complete antibiotics tomorrow.  Continue with NPO status and cor Trak feeds.  Reevaluate swallowing in another couple days per speech therapy  2.  SVT April 2017 with Holter not showing A. fib.  Patient was admitted 7/17 at [facility] with PAF.  He was started on Eliquis by the physician there.  3.  Anemia with low iron stores, likely secondary to chronic disease  4.  Left knee effusion with some symptoms.  Ortho notified regarding injection.  Probable pseudogout  5.  Weakness.  I discussed earlier with Dr. Rivera: Subacute versus acute rehab.   6. Right shoulder pain from rotator tendonitis. Still sore. Ice/heat and exercises.  Was injected several days ago by Ortho.    I discussed earlier with the nurse.  Discussed just now by phone with patient's daughter    Dhara Morris MD  08/01/17  6:27 PM

## 2017-08-01 NOTE — PLAN OF CARE
Problem: Patient Care Overview (Adult)  Goal: Plan of Care Review  Outcome: Ongoing (interventions implemented as appropriate)    08/01/17 8270   Coping/Psychosocial Response Interventions   Plan Of Care Reviewed With patient;daughter   Patient Care Overview   Progress improving   Outcome Evaluation   Outcome Summary/Follow up Plan long tfer to chair w/o incr pain or dizziness reported

## 2017-08-02 LAB
GLUCOSE BLDC GLUCOMTR-MCNC: 120 MG/DL (ref 70–130)
GLUCOSE BLDC GLUCOMTR-MCNC: 135 MG/DL (ref 70–130)
GLUCOSE BLDC GLUCOMTR-MCNC: 148 MG/DL (ref 70–130)

## 2017-08-02 PROCEDURE — 97535 SELF CARE MNGMENT TRAINING: CPT

## 2017-08-02 PROCEDURE — 97110 THERAPEUTIC EXERCISES: CPT

## 2017-08-02 PROCEDURE — 82962 GLUCOSE BLOOD TEST: CPT

## 2017-08-02 RX ADMIN — PREGABALIN 50 MG: 50 CAPSULE ORAL at 09:23

## 2017-08-02 RX ADMIN — AMOXICILLIN AND CLAVULANATE POTASSIUM 500 MG: 250; 62.5 POWDER, FOR SUSPENSION ORAL at 09:22

## 2017-08-02 RX ADMIN — APIXABAN 2.5 MG: 2.5 TABLET, FILM COATED ORAL at 17:25

## 2017-08-02 RX ADMIN — TERAZOSIN HYDROCHLORIDE 5 MG: 5 CAPSULE ORAL at 20:19

## 2017-08-02 RX ADMIN — CYANOCOBALAMIN TAB 500 MCG 1000 MCG: 500 TAB at 09:23

## 2017-08-02 RX ADMIN — AMOXICILLIN AND CLAVULANATE POTASSIUM 500 MG: 250; 62.5 POWDER, FOR SUSPENSION ORAL at 20:18

## 2017-08-02 RX ADMIN — NYSTATIN 500000 UNITS: 100000 SUSPENSION ORAL at 20:18

## 2017-08-02 RX ADMIN — NYSTATIN 500000 UNITS: 100000 SUSPENSION ORAL at 15:54

## 2017-08-02 RX ADMIN — CARVEDILOL 3.12 MG: 3.12 TABLET, FILM COATED ORAL at 17:25

## 2017-08-02 RX ADMIN — CARVEDILOL 3.12 MG: 3.12 TABLET, FILM COATED ORAL at 09:24

## 2017-08-02 RX ADMIN — PREGABALIN 50 MG: 50 CAPSULE ORAL at 12:28

## 2017-08-02 RX ADMIN — ASPIRIN 81 MG: 81 TABLET, CHEWABLE ORAL at 09:23

## 2017-08-02 RX ADMIN — OXYCODONE HYDROCHLORIDE AND ACETAMINOPHEN 1 TABLET: 5; 325 TABLET ORAL at 20:18

## 2017-08-02 RX ADMIN — OXYCODONE HYDROCHLORIDE AND ACETAMINOPHEN 1 TABLET: 5; 325 TABLET ORAL at 16:03

## 2017-08-02 RX ADMIN — OXYCODONE HYDROCHLORIDE AND ACETAMINOPHEN 1 TABLET: 5; 325 TABLET ORAL at 09:23

## 2017-08-02 RX ADMIN — ATORVASTATIN CALCIUM 80 MG: 80 TABLET, FILM COATED ORAL at 20:18

## 2017-08-02 RX ADMIN — APIXABAN 2.5 MG: 2.5 TABLET, FILM COATED ORAL at 09:24

## 2017-08-02 NOTE — PROGRESS NOTES
"     LOS: 6 days   Primary Care Physician: Symone Ford MD     Subjective   Feels better today- stronger.  Left knee does not hurt.  Had aspiration and injection of left knee yesterday.  No crystals were seen  Daughter at bedside    Vital Signs  Body mass index is 23.68 kg/(m^2).  Temp:  [97.3 °F (36.3 °C)-98.2 °F (36.8 °C)] 97.4 °F (36.3 °C)  Heart Rate:  [56-73] 73  Resp:  [20] 20  BP: (110-132)/(59-71) 132/70      Objective:  General Appearance:  In no acute distress.    Vital signs: (most recent): Blood pressure 132/70, pulse 73, temperature 97.4 °F (36.3 °C), temperature source Oral, resp. rate 20, height 67\" (170.2 cm), weight 151 lb 3.2 oz (68.6 kg), SpO2 97 %.    HEENT: (White plaque on tongue)    Lungs:  There are decreased breath sounds.  No wheezes, rales or rhonchi.    Heart: Normal rate.  Regular rhythm.    Abdomen: Abdomen is soft and non-distended.  Bowel sounds are normal.   There is no abdominal tenderness.   There is no splenomegaly. There is no hepatomegaly.   Extremities: There is no dependent edema.    Neurological: Patient is alert.    Skin:  Warm and dry.          Results Review:    I reviewed the patient's new clinical results.      Results from last 7 days  Lab Units 07/30/17  0558 07/29/17  0444   WBC 10*3/mm3 10.11 13.07*   HEMOGLOBIN g/dL 11.5* 10.8*   PLATELETS 10*3/mm3 482 414       Results from last 7 days  Lab Units 07/30/17  0558 07/29/17  0444   SODIUM mmol/L 138 139   POTASSIUM mmol/L 4.1 3.9   CHLORIDE mmol/L 103 105   CO2 mmol/L 23.7 23.2   BUN mg/dL 24* 25*   CREATININE mg/dL 0.97 1.11   CALCIUM mg/dL 9.0 8.9   GLUCOSE mg/dL 132* 134*         Hemoglobin A1C:  Lab Results   Component Value Date    HGBA1C 5.00 07/27/2017       Glucose Range:  Glucose   Date/Time Value Ref Range Status   08/02/2017 1136 120 70 - 130 mg/dL Final   08/02/2017 0616 148 (H) 70 - 130 mg/dL Final       Lab Results   Component Value Date    [id number] 417 07/28/2017       Lab Results   Component " Value Date    TSH 0.957 07/27/2017       Assessment & Plan      Medication Review: Yes    Active Hospital Problems (** Indicates Principal Problem)    Diagnosis Date Noted   • **Aspiration pneumonia [J69.0] 07/30/2017   • Knee effusion, left [M25.462] 07/31/2017   • Right shoulder pain [M25.511] 07/27/2017   • Paroxysmal atrial fibrillation [I48.0] 07/27/2017   • Oropharyngeal dysphagia [R13.12] 07/27/2017      Resolved Hospital Problems    Diagnosis Date Noted Date Resolved   • Confusion [R41.0] 07/27/2017 07/31/2017   • Leukocytosis [D72.829] 07/27/2017 07/31/2017   • JARRED (acute kidney injury) [N17.9] 07/26/2017 07/30/2017   • Left lower quadrant pain [R10.32] 07/26/2017 07/31/2017       Assessment/Plan  1.  Aspiration pneumonia secondary to severe oropharyngeal dysphagia.  Finish his antibiotics today.  He's stronger.  Speech to reevaluate today or tomorrow to decide about diet.  2.  Weakness.  Tang to evaluate today.  3.  Paroxysmal atrial fibrillation diagnosed in July 2017 at [facility].  Eliquis started at that time.  Holter monitor showed SVT April 2017.  Patient followed through [facility]  4.  Right shoulder pain secondary to rotator cuff tendinitis.  Continue heat and exercises.  Was injected several days ago.  Left knee effusion was injected and aspirated yesterday.  No crystals seen.  Probably just  5.  Anemia with low iron stores, probably secondary to chronic disease.  Outpatient follow-up.  6.  Thrush.  Start nystatin swish and spit    Disposition-acute versus subacute rehabilitation.  Still has cor Trak which will affect whether he can be discharged today.    Dhara Morris MD  08/02/17  11:44 AM

## 2017-08-02 NOTE — PLAN OF CARE
Problem: Patient Care Overview (Adult)  Goal: Plan of Care Review  Outcome: Ongoing (interventions implemented as appropriate)    08/02/17 1750   Coping/Psychosocial Response Interventions   Plan Of Care Reviewed With patient;daughter   Patient Care Overview   Progress improving   Outcome Evaluation   Outcome Summary/Follow up Plan VSS. Up with PT to chair today. continues swallow exercises. VFSS for tomorrow per ST. Tolerating TF. Turn/reposition q 2hrs. Safety maintained. Continue to monitor.        Goal: Adult Individualization and Mutuality  Outcome: Ongoing (interventions implemented as appropriate)  Goal: Discharge Needs Assessment  Outcome: Ongoing (interventions implemented as appropriate)    Problem: Pain, Acute (Adult)  Goal: Acceptable Pain Control/Comfort Level  Outcome: Ongoing (interventions implemented as appropriate)    Problem: Fall Risk (Adult)  Goal: Absence of Falls  Outcome: Ongoing (interventions implemented as appropriate)    Problem: Skin Integrity Impairment, Risk/Actual (Adult)  Goal: Skin Integrity/Wound Healing  Outcome: Ongoing (interventions implemented as appropriate)    Problem: Nutrition, Enteral (Adult)  Goal: Signs and Symptoms of Listed Potential Problems Will be Absent or Manageable (Nutrition, Enteral)  Outcome: Ongoing (interventions implemented as appropriate)    Problem: Pressure Ulcer Risk (Brian Scale) (Adult,Obstetrics,Pediatric)  Goal: Skin Integrity  Outcome: Ongoing (interventions implemented as appropriate)

## 2017-08-02 NOTE — PROGRESS NOTES
Adult Nutrition  Assessment/PES    Patient Name:  Adrian Thibodeaux  YOB: 1931  MRN: [id number]  Admit Date:  7/26/2017    Assessment Date:  8/2/2017        Reason for Assessment       08/02/17 1415    Reason for Assessment    Reason For Assessment/Visit follow up protocol;TF/PN                  Labs/Tests/Procedures/Meds       08/02/17 1415    Labs/Tests/Procedures/Meds    Diagnostic Test/Procedure Review reviewed    Labs/Tests Review Reviewed;Glucose    Medication Review Reviewed, pertinent    Significant Vitals reviewed            Physical Findings       08/02/17 1415    Physical Findings/Assessment    Additional Documentation Physical Appearance (Group)    Physical Appearance    Tubes nasojejunal tube              Nutrition Prescription Ordered       08/02/17 1415    Nutrition Prescription EN    Enteral Route NJ    Product Jevity 1.5 doug    TF Delivery Method Continuous    Continuous TF Goal Rate (mL/hr) 50 mL/hr    Water flush (mL)  25 mL    Water Flush Frequency Per hour            Evaluation of Received Nutrient/Fluid Intake       08/02/17 1416    Evaluation of Received Nutrient/Fluid Intake    Nutrition Delivered Calorie Evaluation;Protein Evaluation;Fluid Evaluation    Calorie Intake Evaluation    Enteral Calories (kcal) 1800    Total Calories (kcal) 1800    % Kcal Needs 100    Protein Intake Evaluation    Enteral Protein (gm) 76    Total Protein (gm) 76    % Protein Needs 100    Fluid Intake Evaluation    Enteral  Fluid (mL) 912    Free Water Flush Fluid (mL) 600    Total Fluid Intake (mL) 1512        Problem/Interventions:            Intervention Goal       08/02/17 1418    Intervention Goal    General Maintain nutrition;Nutrition support treatment    TF/PN Maintain TF/PN;Tolerate TF at goal    Weight Maintain weight            Nutrition Intervention       08/02/17 1421    Nutrition Intervention    RD/Tech Action Follow Tx progress;Care plan reviewd            Nutrition Prescription        08/02/17 1421    Nutrition Prescription EN    Enteral Prescription Continue same protocol            Education/Evaluation       08/02/17 1421    Education    Education Will Instruct as appropriate    Monitor/Evaluation    Monitor Per protocol    Education Follow-up Reinforce PRN          Electronically signed by:  Ruth Coronado RD  08/02/17 2:21 PM

## 2017-08-02 NOTE — PLAN OF CARE
Problem: Patient Care Overview (Adult)  Goal: Plan of Care Review  Outcome: Ongoing (interventions implemented as appropriate)    08/02/17 1222   Coping/Psychosocial Response Interventions   Plan Of Care Reviewed With patient   Patient Care Overview   Progress improving   Outcome Evaluation   Outcome Summary/Follow up Plan pt alert and motivated for therapy today. Making progress for functional activity

## 2017-08-02 NOTE — PLAN OF CARE
Problem: Patient Care Overview (Adult)  Goal: Plan of Care Review  Outcome: Ongoing (interventions implemented as appropriate)    08/02/17 0942   Coping/Psychosocial Response Interventions   Plan Of Care Reviewed With patient   Outcome Evaluation   Outcome Summary/Follow up Plan Pt required increased assistance with transfers today secondary increased posterior lean and difficulty acheiving fully erect stand. Pt was however, able to get to chair with assistance of two. Pt may benefit from continued PT services to address strength, balance, and mobility.

## 2017-08-02 NOTE — PLAN OF CARE
Problem: Patient Care Overview (Adult)  Goal: Plan of Care Review  Outcome: Ongoing (interventions implemented as appropriate)    08/02/17 0506   Coping/Psychosocial Response Interventions   Plan Of Care Reviewed With patient   Patient Care Overview   Progress improving   Outcome Evaluation   Outcome Summary/Follow up Plan CONTINUE TO MONITOR VS, LABS, HOB 30 TO 45, SWALLOW EVAL TODAY, TUBE FEEDINGS AT GOAL, TURN Q 2, PAIN, SAFETY       Goal: Adult Individualization and Mutuality  Outcome: Ongoing (interventions implemented as appropriate)    08/02/17 0506   Individualization   Patient Specific Interventions MONITOR FOR PAIN RELIEF; SPECIMAN OBTAINED VIA DR TRAN AND SENT TO LAB         Problem: Pain, Acute (Adult)  Goal: Acceptable Pain Control/Comfort Level  Outcome: Ongoing (interventions implemented as appropriate)    08/02/17 0506   Pain, Acute (Adult)   Acceptable Pain Control/Comfort Level making progress toward outcome         Problem: Fall Risk (Adult)  Goal: Absence of Falls  Outcome: Ongoing (interventions implemented as appropriate)    08/02/17 0506   Fall Risk (Adult)   Absence of Falls making progress toward outcome         Problem: Skin Integrity Impairment, Risk/Actual (Adult)  Goal: Skin Integrity/Wound Healing  Outcome: Ongoing (interventions implemented as appropriate)    08/02/17 0506   Skin Integrity Impairment, Risk/Actual (Adult)   Skin Integrity/Wound Healing making progress toward outcome         Problem: Nutrition, Enteral (Adult)  Goal: Signs and Symptoms of Listed Potential Problems Will be Absent or Manageable (Nutrition, Enteral)  Outcome: Ongoing (interventions implemented as appropriate)    08/02/17 0506   Nutrition, Enteral   Problems Assessed (Enteral Nutrition) all   Problems Present (Enteral Nutrition) aspiration         Problem: Pressure Ulcer Risk (Brian Scale) (Adult,Obstetrics,Pediatric)  Goal: Identify Related Risk Factors and Signs and Symptoms  Outcome: Outcome(s)  achieved Date Met:  08/02/17 08/02/17 0506   Pressure Ulcer Risk (Brian Scale)   Related Risk Factors (Pressure Ulcer Risk (Brian Scale)) mobility impaired       Goal: Skin Integrity  Outcome: Ongoing (interventions implemented as appropriate)    08/02/17 0506   Pressure Ulcer Risk (Brian Scale) (Adult,Obstetrics,Pediatric)   Skin Integrity making progress toward outcome

## 2017-08-02 NOTE — PROGRESS NOTES
Continued Stay Note  [facility]     Patient Name: Adrian Thibodeaux  MRN: [id number]  Today's Date: 8/2/2017    Admit Date: 7/26/2017          Discharge Plan       08/02/17 1343    Case Management/Social Work Plan    Plan [facility] rehab     Additional Comments Multiple conversations with daughter  Pt accepted to [facility] rehab.  Pt will go to [facility] if he still has cortax if it is removed he will go to [facility].              Discharge Codes     None        Expected Discharge Date and Time     Expected Discharge Date Expected Discharge Time    Jul 27, 2017             Tamia Salazar RN

## 2017-08-02 NOTE — THERAPY TREATMENT NOTE
Acute Care - Physical Therapy Treatment Note  [facility]     Patient Name: Adrian Thibodeaux  : 1931  MRN: [id number]  Today's Date: 2017  Onset of Illness/Injury or Date of Surgery Date: 17          Admit Date: 2017    Visit Dx:    ICD-10-CM ICD-9-CM   1. Left lower quadrant pain R10.32 789.04   2. Leukocytosis, unspecified type D72.829 288.60   3. Renal insufficiency N28.9 593.9   4. Confusion R41.0 298.9   5. Generalized weakness R53.1 780.79     Patient Active Problem List   Diagnosis   • Right shoulder pain   • Paroxysmal atrial fibrillation   • Oropharyngeal dysphagia   • Aspiration pneumonia   • Knee effusion, left               Adult Rehabilitation Note       17 0924 17 1600 17 1300    Rehab Assessment/Intervention    Discipline physical therapist  -CH  occupational therapist  -SG    Document Type therapy note (daily note)  -CH  therapy note (daily note)  -SG    Subjective Information agree to therapy  -  agree to therapy  -SG    Patient Effort, Rehab Treatment good  -  adequate  -SG    Symptoms Noted During/After Treatment none  -      Precautions/Limitations fall precautions  -  fall precautions;oxygen therapy device and L/min  -SG    Recorded by [CH] Kathia Saeed, PT  [SG] SAILAJA Fernandez    Pain Assessment    Pain Assessment 0-10  - No/denies pain  - No/denies pain  -    Pain Score 8  -      Pain Location Knee  -      Pain Orientation Left  -      Pain Intervention(s) Medication (See MAR);Repositioned  - Medication (See MAR)  - Medication (See MAR)  -    Response to Interventions  long, meds helped ldr  - long, meds helped shldr  -JM    Recorded by [CH] Kathia Saeed, PT [JM] Mera Rendon PTA [JM] Mera Rendon PTA    Cognitive Assessment/Intervention    Current Cognitive/Communication Assessment functional  -CH  functional  -SG    Orientation Status oriented x 4  -CH  oriented x 4  -SG    Follows Commands/Answers  Questions 100% of the time  -  100% of the time;able to follow single-step instructions  -    Personal Safety WNL/WFL  -      Personal Safety Interventions fall prevention program maintained;gait belt;nonskid shoes/slippers when out of bed  -      Recorded by [CH] Kathia Saeed, PT  [SG] Aniyah Lozada, ANDREWR    ROM (Range of Motion)    General ROM Detail   RUE remains limited with AROM  -    Recorded by   [SG] Aniyah Lozada OTR    Bed Mobility, Assessment/Treatment    Bed Mob, Supine to Sit, [place] verbal cues required;nonverbal cues required (demo/gesture);minimum assist (75% patient effort)  - minimum assist (75% patient effort);verbal cues required  - minimum assist (75% patient effort)  -    Bed Mob, Sit to Supine, Spring not tested   sitting in chair  - not tested  - minimum assist (75% patient effort)  -SG    Recorded by [CH] Kathia Saeed, PT [JM] Mera Rendon PTA [SG] Aniyah Lozada OTR    Transfer Assessment/Treatment    Transfers, Bed-Chair [place] verbal cues required;nonverbal cues required (demo/gesture);moderate assist (50% patient effort);2 person assist required;hand held assist  - minimum assist (75% patient effort);moderate assist (50% patient effort);2 person assist required;verbal cues required;nonverbal cues required (demo/gesture)  -     Transfers, Sit-Stand [place] verbal cues required;nonverbal cues required (demo/gesture);minimum assist (75% patient effort);2 person assist required;hand held assist  - 2 person assist required;minimum assist (75% patient effort);moderate assist (50% patient effort);hand held assist  -     Transfers, Stand-Sit [place] verbal cues required;nonverbal cues required (demo/gesture);minimum assist (75% patient effort);2 person assist required;hand held assist  - minimum assist (75% patient effort);2 person assist required  -     Transfer, Comment pt with strong posterior lean and difficulty  coming to full erect stand, small shuffling steps bed to chair  -  shuffle steps to chair w/knees blocked  - just back to bed after sitting in chair per pt and daughter and nsg  -SG    Recorded by [CH] Kathia Saeed, PT [JM] Mera Rendon PTA [SG] Aniyah Lozada, OTR    ADL Assessment/Intervention    Additional Documentation   --   adls limited at this time due to RUE limited function  -    Recorded by   [SG] Aniyah Lozada, ANDREWR    Balance Skills Training    Sitting-Level of Assistance  Close supervision;Contact guard  - Close supervision  -    Sitting-Balance Support  Feet supported  -     Sitting-Balance Activities  Head control activities (Comment);Trunk control activities;Right UE Weight Bearing;Left UE Weight Bearing;Lateral lean;Forward lean  -     Sitting # of Minutes  15  -     Recorded by  [JM] Mera Rendon PTA [SG] Aniyah Lozada, OTR    Therapy Exercises    Bilateral Lower Extremities  AROM:;10 reps;LAQ;knee flexion  -     Recorded by  [] Mera Rendon PTA     Positioning and Restraints    Pre-Treatment Position in bed  - in bed  - in bed  -    Post Treatment Position chair  - chair  - bed  -SG    In Bed  --   dtr to call out if needed, did not want call bell on his lap  - call light within reach;encouraged to call for assist;exit alarm on;with family/caregiver  -SG    In Chair sitting;call light within reach;encouraged to call for assist;with nsg   RN present to give [facility]  - call light within reach;encouraged to call for assist;exit alarm on;with family/caregiver;notified nsg   dtr to call if needed, did not want call light on lap  -     Recorded by [CH] Kathia Saeed, PT [JM] Mera Rendon PTA [SG] Aniyah Lozada, OTR      07/31/17 1500 07/30/17 1608       Rehab Assessment/Intervention    Discipline physical therapy assistant  -ADONIS physical therapist  -ROLA     Document Type therapy note (daily note)  -ADONIS therapy note (daily note)  -ROLA      Subjective Information agree to therapy;complains of;weakness  -CW agree to therapy;no complaints  -KH     Patient Effort, Rehab Treatment adequate  -CW good  -KH     Precautions/Limitations fall precautions;oxygen therapy device and L/min  -CW fall precautions;oxygen therapy device and L/min  -KH     Recorded by [CW] Adonay Maddox [] Roxy Van, PT     Vital Signs    O2 Delivery Post Treatment supplemental O2  -CW      Recorded by [CW] Adonay Maddox      Pain Assessment    Pain Assessment 0-10  -CW No/denies pain  -KH     Pain Score 6  -CW      Post Pain Score 6  -CW      Pain Location Shoulder  -CW      Pain Orientation Right  -CW      Pain Intervention(s) Repositioned;Ambulation/increased activity  -CW      Response to Interventions long  -CW      Recorded by [CW] Adonay Maddox [] Roxy Van, PT     Cognitive Assessment/Intervention    Current Cognitive/Communication Assessment functional  -CW impaired  -KH     Orientation Status oriented x 4  -CW oriented x 4  -KH     Follows Commands/Answers Questions 100% of the time  -CW 75% of the time  -KH     Personal Safety WNL/WFL  -CW mild impairment;decreased awareness, need for assist;decreased awareness, need for safety  -KH     Personal Safety Interventions fall prevention program maintained;gait belt;muscle strengthening facilitated;nonskid shoes/slippers when out of bed  -CW fall prevention program maintained;gait belt;nonskid shoes/slippers when out of bed  -KH     Recorded by [CW] Adonay Maddox [] Roxy Van, PT     Bed Mobility, Assessment/Treatment    Bed Mob, Supine to Sit, [place] minimum assist (75% patient effort);supervision required;verbal cues required  -CW minimum assist (75% patient effort);moderate assist (50% patient effort);supervision required;verbal cues required  -KH     Bed Mob, Sit to Supine, [place] minimum assist (75% patient effort)  -CW minimum assist (75% patient effort);moderate assist  (50% patient effort);2 person assist required  -KH     Recorded by [CW] Adonay Maddox [KH] Roxy Van PT     Transfer Assessment/Treatment    Transfers, Bed-Chair [place]  moderate assist (50% patient effort);2 person assist required;verbal cues required;nonverbal cues required (demo/gesture)  -KH     Transfers, Chair-Bed [place]  moderate assist (50% patient effort);2 person assist required;verbal cues required;nonverbal cues required (demo/gesture)  -KH     Transfers, Sit-Stand [place]  moderate assist (50% patient effort);2 person assist required;verbal cues required;nonverbal cues required (demo/gesture)  -KH     Transfers, Stand-Sit [place] not tested  -CW moderate assist (50% patient effort);2 person assist required;verbal cues required;nonverbal cues required (demo/gesture)  -     Transfers, Sit-Stand-Sit, Assist Device  --   hand held x 2  -KH     Transfer, Safety Issues  loses balance backward   posterior lean  -KH     Transfer, Impairments  strength decreased;impaired balance  -     Transfer, Comment  poor standing balance due to posterior lean; transferred from bed to transport bed with mod A x 2.  -KH     Recorded by [CW] Adonay Maddox [KH] Roxy Van PT     Gait Assessment/Treatment    Gait, [place] Level  moderate assist (50% patient effort);maximum assist (25% patient effort);2 person assist required;verbal cues required;nonverbal cues required (demo/gesture)  -     Gait, Assistive Device  --   hand held x 2  -     Gait, Distance (Feet)  --   took a couple forward and side steps to transfer beds  -     Gait, Safety Issues  balance decreased during turns;loses balance backward  -     Gait, Impairments  strength decreased;impaired balance  -KH     Recorded by  [KH] Roxy Van, PT     Balance Skills Training    Sitting-Level of Assistance Close supervision  -CW Contact guard;Close supervision  -     Sitting-Balance Support Feet supported   -CW Right upper extremity supported;Left upper extremity supported;Feet supported  -KH     Sitting-Balance Activities Trunk control activities  -CW Trunk control activities  -KH     Sitting # of Minutes 10  -CW      Standing-Level of Assistance  Moderate assistance;x2  -KH     Static Standing Balance Support  Right upper extremity supported;Left upper extremity supported  -KH     Recorded by [CW] Adonay Maddox [KH] Roxy Van, PT     Therapy Exercises    Bilateral Lower Extremities AROM:;10 reps;sitting;ankle pumps/circles;hip flexion;LAQ  -CW      Recorded by [CW] Adonay Maddox      Positioning and Restraints    Pre-Treatment Position in bed  -CW in bed  -KH     Post Treatment Position bed  -CW bed  -KH     In Bed notified nsg;supine;call light within reach;encouraged to call for assist;exit alarm on;with family/caregiver  -CW supine;with other staff   pt leaving with transport  -KH     Recorded by [CW] Adonay Maddox [KH] Roxy Van, PT       User Key  (r) = Recorded By, (t) = Taken By, (c) = Cosigned By    Initials Name Effective Dates    SG Aniyah Lozada, OTR 04/13/15 -     CH Kathia Saeed, PT 12/01/15 -     KH Roxy Van, PT 12/01/15 -     JM Mera Rendon, PTA 02/18/16 -     CW Adonay Maddox 12/13/16 -                 IP PT Goals       07/27/17 1156          Bed Mobility PT LTG    Bed Mobility PT LTG, Time to Achieve 1 wk  -CH      Bed Mobility PT LTG, Activity Type all bed mobility  -CH      Bed Mobility PT LTG, [place] Level minimum assist (75% patient effort);2 person assist required  -CH      Transfer Training PT LTG    Transfer Training PT LTG, Time to Achieve 1 wk  -CH      Transfer Training PT LTG, Activity Type all transfers  -CH      Transfer Training PT LTG, [place] Level minimum assist (75% patient effort);2 person assist required  -CH      Transfer Training PT LTG, Assist Device walker, rolling  -CH      Gait Training PT LTG    Gait Training Goal  PT LTG, Time to Achieve 1 wk  -CH      Gait Training Goal PT LTG, [place] Level minimum assist (75% patient effort);2 person assist required  -CH      Gait Training Goal PT LTG, Assist Device walker, rolling  -CH      Gait Training Goal PT LTG, Distance to Achieve 30  -CH        User Key  (r) = Recorded By, (t) = Taken By, (c) = Cosigned By    Initials Name Provider Type    DOMENICO Saeed, PT Physical Therapist          Physical Therapy Education     Title: PT OT SLP Therapies (Done)     Topic: Physical Therapy (Done)     Point: Mobility training (Done)    Learning Progress Summary    Learner Readiness Method Response Comment Documented by Status   Patient Acceptance TB,E,D VU,NR   08/02/17 0928 Done    Eager TB,E,D VU,[facility] 08/01/17 1629 Done    Acceptance E,TB DU,VU   07/31/17 1529 Done    Acceptance E VU   07/30/17 1613 Done    Acceptance E VU Attempted to weight shift in standing but patient has difficulty unweighting one leg to take even a small step [facility] 07/29/17 1422 Done    Acceptance E,TB DU,VU   07/28/17 1511 Done    Acceptance E,TB,D VU,[facility] 07/27/17 1155 Done   Family Eager TB,E,D VU,[facility] 08/01/17 1629 Done               Point: Home exercise program (Done)    Learning Progress Summary    Learner Readiness Method Response Comment Documented by Status   Patient Eager TB,E,D VU,NR   08/01/17 1629 Done    Acceptance E,TB DU,VU   07/31/17 1529 Done    Acceptance E,TB DU,VU   07/28/17 1511 Done   Family Eager TB,E,D VU,[facility] 08/01/17 1629 Done               Point: Body mechanics (Done)    Learning Progress Summary    Learner Readiness Method Response Comment Documented by Status   Patient Acceptance TB,E,D VU,NR   08/02/17 0928 Done    Eager TB,E,D VU,[facility] 08/01/17 1629 Done    Acceptance E,TB DU,VU   07/31/17 1529 Done    Acceptance E VU Attempted to weight shift in standing but patient has difficulty unweighting one leg to take even a small step [facility] 07/29/17 1422 Done     Acceptance E,TB DU,VU   07/28/17 1511 Done    Acceptance E,TB,D VU,NR   07/27/17 1155 Done   Family Eager TB,E,D VU,NR   08/01/17 1629 Done               Point: Precautions (Done)    Learning Progress Summary    Learner Readiness Method Response Comment Documented by Status   Patient Acceptance TB,E,D VU,NR   08/02/17 0928 Done    Eager TB,E,D VU,NR   08/01/17 1629 Done    Acceptance E,TB DU,VU   07/31/17 1529 Done    Acceptance E,TB DU,VU   07/28/17 1511 Done    Acceptance E,TB,D VU,NR   07/27/17 1155 Done   Family Eager TB,E,D VU,NR   08/01/17 1629 Done                      User Key     Initials Effective Dates Name Provider Type Discipline     12/01/15 -  Kathia Saeed, PT Physical Therapist PT     12/01/15 -  Roxy Van, PT Physical Therapist PT     02/18/16 -  Mera Rendon, PTA Physical Therapy Assistant PT    [facility] 06/22/16 -  Ana Thao, PT Physical Therapist PT     12/13/16 -  Adonay Maddox Physical Therapy Assistant PT                    PT Recommendation and Plan  Anticipated Discharge Disposition: skilled nursing facility  Planned Therapy Interventions: balance training, bed mobility training, gait training, home exercise program, patient/family education, transfer training  PT Frequency: daily  Plan of Care Review  Plan Of Care Reviewed With: patient  Outcome Summary/Follow up Plan: Pt required increased assistance with transfers today secondary increased posterior lean and difficulty acheiving fully erect stand. Pt was however, able to get to chair with assistance of two. Pt may benefit from continued PT services to address strength, balance, and mobility.          Outcome Measures       08/02/17 0900 08/01/17 1600 08/01/17 1346    How much help from another person do you currently need...    Turning from your back to your side while in flat bed without using bedrails? 3  -CH 3  -     Moving from lying on back to sitting on the side of a flat bed without  bedrails? 3  -CH 3  -JM     Moving to and from a bed to a chair (including a wheelchair)? 3  -CH 2  -JM     Standing up from a chair using your arms (e.g., wheelchair, bedside chair)? 2  -CH 2  -JM     Climbing 3-5 steps with a railing? 1  -CH 1  -JM     To walk in hospital room? 1  -CH 1  -JM     AM-PAC 6 Clicks Score 13  -CH 12  -JM     How much help from another is currently needed...    Putting on and taking off regular lower body clothing?   1  -SG    Bathing (including washing, rinsing, and drying)   1  -SG    Toileting (which includes using toilet bed pan or urinal)   1  -SG    Putting on and taking off regular upper body clothing   1  -SG    Taking care of personal grooming (such as brushing teeth)   2  -SG    Eating meals   1  -SG    Score   7  -SG    Functional Assessment    Outcome Measure Options AM-PAC 6 Clicks Basic Mobility (PT)  -CH        07/31/17 1500 07/30/17 1614       How much help from another person do you currently need...    Turning from your back to your side while in flat bed without using bedrails? 3  -CW 3  -KH     Moving from lying on back to sitting on the side of a flat bed without bedrails? 3  -CW 2  -KH     Moving to and from a bed to a chair (including a wheelchair)? 2  -CW 2  -KH     Standing up from a chair using your arms (e.g., wheelchair, bedside chair)? 2  -CW 2  -KH     Climbing 3-5 steps with a railing? 1  -CW 1  -KH     To walk in hospital room? 1  -CW 1  -KH     AM-PAC 6 Clicks Score 12  -CW 11  -KH     Functional Assessment    Outcome Measure Options  AM-PAC 6 Clicks Basic Mobility (PT)  -KH       User Key  (r) = Recorded By, (t) = Taken By, (c) = Cosigned By    Initials Name Provider Type    SG Aniyah Lozada, OTR Occupational Therapist    CH Kathia Saeed, PT Physical Therapist    ROLA Van, PT Physical Therapist    ALIN Rendon, PTA Physical Therapy Assistant    CW Adonay Maddox Physical Therapy Assistant           Time Calculation:         PT  Charges       08/02/17 0931          Time Calculation    Start Time 0914  -      Stop Time 0923  -      Time Calculation (min) 9 min  -      PT Received On 08/02/17  -      PT - Next Appointment 08/03/17  -        User Key  (r) = Recorded By, (t) = Taken By, (c) = Cosigned By    Initials Name Provider Type     Kathia Saeed PT Physical Therapist          Therapy Charges for Today     Code Description Service Date Service Provider Modifiers Qty    51418208201 HC PT THER PROC EA 15 MIN 8/2/2017 Kathia Saeed, PT GP 1    25287990009 HC PT THER SUPP EA 15 MIN 8/2/2017 Kathia Saeed, PT GP 1          PT G-Codes  Outcome Measure Options: AM-PAC 6 Clicks Basic Mobility (PT)    Kathia Saeed PT  8/2/2017

## 2017-08-02 NOTE — THERAPY TREATMENT NOTE
Acute Care - Occupational Therapy Progress Note  [facility]     Patient Name: Adrian Thibodeaux  : 1931  MRN: [id number]  Today's Date: 2017  Onset of Illness/Injury or Date of Surgery Date: 17            Admit Date: 2017    Visit Dx:     ICD-10-CM ICD-9-CM   1. Left lower quadrant pain R10.32 789.04   2. Leukocytosis, unspecified type D72.829 288.60   3. Renal insufficiency N28.9 593.9   4. Confusion R41.0 298.9   5. Generalized weakness R53.1 780.79     Patient Active Problem List   Diagnosis   • Right shoulder pain   • Paroxysmal atrial fibrillation   • Oropharyngeal dysphagia   • Aspiration pneumonia   • Knee effusion, left             Adult Rehabilitation Note       17 1202 17 0924 17 1600    Rehab Assessment/Intervention    Discipline occupational therapist  -SG physical therapist  -CH     Document Type therapy note (daily note)  -SG therapy note (daily note)  -     Subjective Information agree to therapy  -SG agree to therapy  -     Patient Effort, Rehab Treatment good  -SG good  -     Symptoms Noted During/After Treatment  none  -     Precautions/Limitations  fall precautions  -     Recorded by [SG] Aniyah Lozada, OTR [CH] Kathia Saeed, PT     Pain Assessment    Pain Assessment  0-10  - No/denies pain  -    Pain Score  8  -CH     Pain Location  Knee  -     Pain Orientation  Left  -     Pain Intervention(s)  Medication (See MAR);Repositioned  - Medication (See MAR)  -    Response to Interventions   long, meds helped shldr  -    Recorded by  [CH] Kathia Saeed, PT [JM] Mera Rendon PTA    Cognitive Assessment/Intervention    Current Cognitive/Communication Assessment functional  - functional  -     Orientation Status oriented x 4  -SG oriented x 4  -CH     Follows Commands/Answers Questions 100% of the time;able to follow single-step instructions  -% of the time  -     Personal Safety  WNL/WFL  -     Personal Safety  Interventions  fall prevention program maintained;gait belt;nonskid shoes/slippers when out of bed  -     Recorded by [SG] Aniyah Lozada, OTR [CH] Kathia Saeed PT     ROM (Range of Motion)    General ROM Detail moving RUE actively today.   -SG      Recorded by [SG] Aniyah Lozada OTR      Bed Mobility, Assessment/Treatment    Bed Mob, Supine to Sit, [place]  verbal cues required;nonverbal cues required (demo/gesture);minimum assist (75% patient effort)  - minimum assist (75% patient effort);verbal cues required  -    Bed Mob, Sit to Supine, [place]  not tested   sitting in chair  - not tested  -    Bed Mobility, Comment sitting in chair  -SG      Recorded by [SG] Aniyah Lozada OTR [] Kathia Saeed, PT [JM] Mera Rendon PTA    Transfer Assessment/Treatment    Transfers, Bed-Chair [place]  verbal cues required;nonverbal cues required (demo/gesture);moderate assist (50% patient effort);2 person assist required;hand held assist  - minimum assist (75% patient effort);moderate assist (50% patient effort);2 person assist required;verbal cues required;nonverbal cues required (demo/gesture)  -    Transfers, Sit-Stand [place]  verbal cues required;nonverbal cues required (demo/gesture);minimum assist (75% patient effort);2 person assist required;hand held assist  - 2 person assist required;minimum assist (75% patient effort);moderate assist (50% patient effort);hand held assist  -    Transfers, Stand-Sit [place]  verbal cues required;nonverbal cues required (demo/gesture);minimum assist (75% patient effort);2 person assist required;hand held assist  - minimum assist (75% patient effort);2 person assist required  -    Transfer, Comment  pt with strong posterior lean and difficulty coming to full erect stand, small shuffling steps bed to chair  -  shuffle steps to chair w/knees blocked  -    Recorded by  [CH] Kathia Saeed, PT [JM] Mera Rendon,  PTA    Upper Body Dressing Assessment/Training    UB Dressing Assess/Train, Clothing Type doffing:;donning:;hospital gown  -      UB Dressing Assess/Train, Position sitting  -      UB Dressing Assess/Train, [place] set up required;supervision required  -      Recorded by [SG] Aniyah Lozada, OTR      Lower Body Dressing Assessment/Training    LB Dressing Assess/Train, Clothing Type doffing:;donning:;slipper socks  -      LB Dressing Assess/Train, Position sitting  -      LB Dressing Assess/Train, [place] maximum assist (25% patient effort)  -      LB Dressing Assess/Train, Comment discussed safety with technique with pt  -      Recorded by [SG] Aniyah Lozada OTR      Grooming Assessment/Training    Grooming Assess/Train, Position sitting  -      Grooming Assess/Train, Indepen Level supervision required  -      Recorded by [SG] Aniyah Lozada, OTR      Balance Skills Training    Sitting-Level of Assistance   Close supervision;Contact guard  -    Sitting-Balance Support   Feet supported  -    Sitting-Balance Activities   Head control activities (Comment);Trunk control activities;Right UE Weight Bearing;Left UE Weight Bearing;Lateral lean;Forward lean  -    Sitting # of Minutes   15  -    Recorded by   [JM] Mera Rendon, DEVI    Therapy Exercises    Bilateral Lower Extremities   AROM:;10 reps;LAQ;knee flexion  -    Recorded by   [] Mera Rendon, DEVI    Positioning and Restraints    Pre-Treatment Position  in bed  - in bed  -    Post Treatment Position chair  - chair  - chair  -    In Bed   --   dtr to call out if needed, did not want call bell on his lap  -    In Chair sitting;call light within reach;encouraged to call for assist;exit alarm on;with family/caregiver  - sitting;call light within reach;encouraged to call for assist;with nsg   RN present to give meds  - call light within reach;encouraged to call for assist;exit alarm on;with  family/caregiver;notified nsg   dtr to call if needed, did not want call light on lap  -    Recorded by [SG] Aniyah Lozada, OTR [CH] Kathia Saeed, PT [JM] Mera Rendon, PTA      08/01/17 1300 07/31/17 1500 07/30/17 1608    Rehab Assessment/Intervention    Discipline occupational therapist  -SG physical therapy assistant  -CW physical therapist  -KH    Document Type therapy note (daily note)  -SG therapy note (daily note)  -CW therapy note (daily note)  -KH    Subjective Information agree to therapy  -SG agree to therapy;complains of;weakness  -CW agree to therapy;no complaints  -KH    Patient Effort, Rehab Treatment adequate  -SG adequate  -CW good  -KH    Precautions/Limitations fall precautions;oxygen therapy device and L/min  -SG fall precautions;oxygen therapy device and L/min  -CW fall precautions;oxygen therapy device and L/min  -KH    Recorded by [SG] Aniyah Lozada, OTR [CW] Adonay Maddox [KH] Roxy Van, PT    Vital Signs    O2 Delivery Post Treatment  supplemental O2  -CW     Recorded by  [CW] Adonay Maddox     Pain Assessment    Pain Assessment No/denies pain  - 0-10  -CW No/denies pain  -    Pain Score  6  -CW     Post Pain Score  6  -CW     Pain Location  Shoulder  -CW     Pain Orientation  Right  -CW     Pain Intervention(s) Medication (See MAR)  -JM Repositioned;Ambulation/increased activity  -CW     Response to Interventions long, meds helped shldr  - long  -CW     Recorded by [JM] Mera Rendon PTA [CW] Adonay Maddox [KH] Roxy Van, PT    Cognitive Assessment/Intervention    Current Cognitive/Communication Assessment functional  -SG functional  -CW impaired  -KH    Orientation Status oriented x 4  -SG oriented x 4  -CW oriented x 4  -KH    Follows Commands/Answers Questions 100% of the time;able to follow single-step instructions  -% of the time  -CW 75% of the time  -KH    Personal Safety  WNL/WFL  -CW mild impairment;decreased awareness, need for  assist;decreased awareness, need for safety  -    Personal Safety Interventions  fall prevention program maintained;gait belt;muscle strengthening facilitated;nonskid shoes/slippers when out of bed  - fall prevention program maintained;gait belt;nonskid shoes/slippers when out of bed  -KH    Recorded by [SG] SAILAJA Fernandez [CW] Adonay Maddox [KH] Roxy Van, PT    ROM (Range of Motion)    General ROM Detail RUE remains limited with AROM  -SG      Recorded by [SG] SAILAJA Fernandez      Bed Mobility, Assessment/Treatment    Bed Mob, Supine to Sit, [place] minimum assist (75% patient effort)  -SG minimum assist (75% patient effort);supervision required;verbal cues required  -CW minimum assist (75% patient effort);moderate assist (50% patient effort);supervision required;verbal cues required  -    Bed Mob, Sit to Supine, [place] minimum assist (75% patient effort)  -SG minimum assist (75% patient effort)  -CW minimum assist (75% patient effort);moderate assist (50% patient effort);2 person assist required  -    Recorded by [SG] SAILAJA Fernandez [CW] Adonay Maddox [KH] Roxy Van, PT    Transfer Assessment/Treatment    Transfers, Bed-Chair [place]   moderate assist (50% patient effort);2 person assist required;verbal cues required;nonverbal cues required (demo/gesture)  -    Transfers, Chair-Bed [place]   moderate assist (50% patient effort);2 person assist required;verbal cues required;nonverbal cues required (demo/gesture)  -    Transfers, Sit-Stand [place]   moderate assist (50% patient effort);2 person assist required;verbal cues required;nonverbal cues required (demo/gesture)  -    Transfers, Stand-Sit [place]  not tested  - moderate assist (50% patient effort);2 person assist required;verbal cues required;nonverbal cues required (demo/gesture)  -    Transfers, Sit-Stand-Sit, Assist Device   --   hand held x 2  -KH    Transfer, Safety  Issues   loses balance backward   posterior lean  -    Transfer, Impairments   strength decreased;impaired balance  -    Transfer, Comment just back to bed after sitting in chair per pt and daughter and nsg  -SG  poor standing balance due to posterior lean; transferred from bed to transport bed with mod A x 2.  -KH    Recorded by [SG] Aniyah Lozada OTR [CW] Adonay Maddox [KH] Roxy Van, PT    Gait Assessment/Treatment    Gait, [place] Level   moderate assist (50% patient effort);maximum assist (25% patient effort);2 person assist required;verbal cues required;nonverbal cues required (demo/gesture)  -    Gait, Assistive Device   --   hand held x 2  -    Gait, Distance (Feet)   --   took a couple forward and side steps to transfer beds  -    Gait, Safety Issues   balance decreased during turns;loses balance backward  -    Gait, Impairments   strength decreased;impaired balance  -    Recorded by   [KH] Roxy Van, PT    ADL Assessment/Intervention    Additional Documentation --   adls limited at this time due to RUE limited function  -SG      Recorded by [SG] SAILAJA Fernandez      Balance Skills Training    Sitting-Level of Assistance Close supervision  -SG Close supervision  -CW Contact guard;Close supervision  -    Sitting-Balance Support  Feet supported  - Right upper extremity supported;Left upper extremity supported;Feet supported  -    Sitting-Balance Activities  Trunk control activities  - Trunk control activities  -    Sitting # of Minutes  10  -CW     Standing-Level of Assistance   Moderate assistance;x2  -KH    Static Standing Balance Support   Right upper extremity supported;Left upper extremity supported  -    Recorded by [SG] SAILAJA Fernandez [CW] Adonay Maddox [KH] Roxy Van, PT    Therapy Exercises    Bilateral Lower Extremities  AROM:;10 reps;sitting;ankle pumps/circles;hip flexion;LAQ  -CW     Recorded by  [CW] Adonay Maddox      Positioning and Restraints    Pre-Treatment Position in bed  -SG in bed  -CW in bed  -KH    Post Treatment Position bed  -SG bed  -CW bed  -KH    In Bed call light within reach;encouraged to call for assist;exit alarm on;with family/caregiver  -SG notified nsg;supine;call light within reach;encouraged to call for assist;exit alarm on;with family/caregiver  -CW supine;with other staff   pt leaving with transport  -KH    Recorded by [SG] Aniyah Lozada, OTR [CW] Adonay Maddox [KH] Roxy Van, PT      User Key  (r) = Recorded By, (t) = Taken By, (c) = Cosigned By    Initials Name Effective Dates    SG Aniyah Lozada, OTR 04/13/15 -     DOMENICO Saeed, PT 12/01/15 -     KH Roxy Van, PT 12/01/15 -     ALIN Rendon, PTA 02/18/16 -     CW Adonay Maddox 12/13/16 -                 OT Goals       07/29/17 1129          Bed Mobility OT LTG    Bed Mobility OT LTG, Date Established 07/29/17  -EB      Bed Mobility OT LTG, Time to Achieve by discharge  -EB      Bed Mobility OT LTG, Activity Type all bed mobility  -EB      Bed Mobility OT LTG, [place] Level minimum assist (75% patient effort)  -EB      Strength OT LTG    Strength Goal OT LTG, Date Established 07/29/17  -EB      Strength Goal OT LTG, Time to Achieve by discharge  -EB      Strength Goal OT LTG, Measure to Achieve PT to complete HEP for BUE strengthening to improve (I) for ADLs.   -EB      Dynamic Sitting Balance OT LTG    Dynamic Sitting Balance OT LTG, Date Established 07/29/17  -EB      Dynamic Sitting Balance OT LTG, Time to Achieve by discharge  -EB      Dynamic Sitting Balance OT LTG, [place] Level supervision required  -EB      Dynamic Sitting Balance OT LTG, Assist Device UE Support  -EB      ADL OT LTG    ADL OT LTG, Date Established 07/29/17  -EB      ADL OT LTG, Time to Achieve by discharge  -EB      ADL OT LTG, Activity Type ADL skills  -EB      ADL OT LTG, [place] Level mod assist  -EB        User Key   (r) = Recorded By, (t) = Taken By, (c) = Cosigned By    Initials Name Provider Type    RALF Nugent OTR Occupational Therapist          Occupational Therapy Education     Title: PT OT SLP Therapies (Done)     Topic: Occupational Therapy (Done)     Point: ADL training (Done)    Description: Instruct learner(s) on proper safety adaptation and remediation techniques during self care or transfers.   Instruct in proper use of assistive devices.    Learning Progress Summary    Learner Readiness Method Response Comment Documented by Status   Patient Eager TB,E,D VU,[facility] 08/01/17 1629 Done    Acceptance E VU,[facility] 07/29/17 1128 Done   Family Eager TB,E,D VU,[facility] 08/01/17 1629 Done               Point: Home exercise program (Done)    Description: Instruct learner(s) on appropriate technique for monitoring, assisting and/or progressing therapeutic exercises/activities.    Learning Progress Summary    Learner Readiness Method Response Comment Documented by Status   Patient Eager TB,E,D VU,[facility] 08/01/17 1629 Done    Acceptance E VU,[facility] 07/29/17 1128 Done   Family Eager TB,E,D VU,[facility] 08/01/17 1629 Done               Point: Precautions (Done)    Description: Instruct learner(s) on prescribed precautions during self-care and functional transfers.    Learning Progress Summary    Learner Readiness Method Response Comment Documented by Status   Patient Eager TB,E,D VU,NR   08/01/17 1629 Done    Acceptance E VU,NR   07/29/17 1128 Done   Family Ana M TB,E,D VU,[facility] 08/01/17 1629 Done               Point: Body mechanics (Done)    Description: Instruct learner(s) on proper positioning and spine alignment during self-care, functional mobility activities and/or exercises.    Learning Progress Summary    Learner Readiness Method Response Comment Documented by Status   Patient Eager TB,E,D VU,NR   08/01/17 1629 Done    Acceptance E VU,[facility] 07/29/17 1128 Done   Family Eager TB,E,D VU,[facility] 08/01/17 1629 Done                       User Key     Initials Effective Dates Name Provider Type Discipline     02/28/17 -  Alicia Nugent, OTR Occupational Therapist OT     02/18/16 -  Mera Rendon PTA Physical Therapy Assistant PT                  OT Recommendation and Plan  Anticipated Discharge Disposition: skilled nursing facility  Planned Therapy Interventions: activity intolerance, ADL retraining, balance training, transfer training, strengthening, neuromuscular re-education, joint mobilization, home exercise program, bed mobility training  Therapy Frequency: 3-5 times/wk  Plan of Care Review  Plan Of Care Reviewed With: patient  Progress: improving  Outcome Summary/Follow up Plan: pt  alert and motivated for therapy today. Making progress for functional activity        Outcome Measures       08/02/17 1223 08/02/17 0900 08/01/17 1600    How much help from another person do you currently need...    Turning from your back to your side while in flat bed without using bedrails?  3  -CH 3  -JM    Moving from lying on back to sitting on the side of a flat bed without bedrails?  3  -CH 3  -JM    Moving to and from a bed to a chair (including a wheelchair)?  3  -CH 2  -JM    Standing up from a chair using your arms (e.g., wheelchair, bedside chair)?  2  -CH 2  -JM    Climbing 3-5 steps with a railing?  1  -CH 1  -JM    To walk in hospital room?  1  -CH 1  -JM    AM-PAC 6 Clicks Score  13  -CH 12  -JM    How much help from another is currently needed...    Putting on and taking off regular lower body clothing? 2  -SG      Bathing (including washing, rinsing, and drying) 2  -SG      Toileting (which includes using toilet bed pan or urinal) 2  -SG      Putting on and taking off regular upper body clothing 3  -SG      Taking care of personal grooming (such as brushing teeth) 3  -SG      Eating meals 1  -SG      Score 13  -SG      Functional Assessment    Outcome Measure Options  AM-PAC 6 Clicks Basic Mobility (PT)  -       08/01/17 1346  07/31/17 1500 07/30/17 1614    How much help from another person do you currently need...    Turning from your back to your side while in flat bed without using bedrails?  3  -CW 3  -KH    Moving from lying on back to sitting on the side of a flat bed without bedrails?  3  -CW 2  -KH    Moving to and from a bed to a chair (including a wheelchair)?  2  -CW 2  -KH    Standing up from a chair using your arms (e.g., wheelchair, bedside chair)?  2  -CW 2  -KH    Climbing 3-5 steps with a railing?  1  -CW 1  -KH    To walk in hospital room?  1  -CW 1  -KH    AM-PAC 6 Clicks Score  12  -CW 11  -KH    How much help from another is currently needed...    Putting on and taking off regular lower body clothing? 1  -SG      Bathing (including washing, rinsing, and drying) 1  -SG      Toileting (which includes using toilet bed pan or urinal) 1  -SG      Putting on and taking off regular upper body clothing 1  -SG      Taking care of personal grooming (such as brushing teeth) 2  -SG      Eating meals 1  -SG      Score 7  -SG      Functional Assessment    Outcome Measure Options   AM-PAC 6 Clicks Basic Mobility (PT)  -KH      User Key  (r) = Recorded By, (t) = Taken By, (c) = Cosigned By    Initials Name Provider Type    LENORE Lozada OTR Occupational Therapist    DOMENICO Saeed, PT Physical Therapist    ROLA Van, PT Physical Therapist    ALIN Rendon, PTA Physical Therapy Assistant    CW Adonay Maddox Physical Therapy Assistant           Time Calculation:         Time Calculation- OT       08/02/17 1224          Time Calculation- OT    OT Start Time 1006  -SG      OT Stop Time 1018  -      OT Time Calculation (min) 12 min  -SG      OT Received On 08/02/17  -        User Key  (r) = Recorded By, (t) = Taken By, (c) = Cosigned By    Initials Name Provider Type    LENORE Lozada OTR Occupational Therapist           Therapy Charges for Today     Code Description Service Date Service Provider  Modifiers Qty    98941392612 HC OT THER PROC EA 15 MIN 8/1/2017 Aniyah Lozada OTR GO 1    65253544319 HC OT SELF CARE/MGMT/TRAIN EA 15 MIN 8/2/2017 Aniyah Lozada OTLEIGH GO 1               Aniyah Lozada, OTR  8/2/2017

## 2017-08-03 ENCOUNTER — APPOINTMENT (OUTPATIENT)
Dept: GENERAL RADIOLOGY | Facility: HOSPITAL | Age: 82
End: 2017-08-03

## 2017-08-03 VITALS
TEMPERATURE: 97.7 F | OXYGEN SATURATION: 98 % | WEIGHT: 146 LBS | DIASTOLIC BLOOD PRESSURE: 78 MMHG | BODY MASS INDEX: 22.91 KG/M2 | HEART RATE: 57 BPM | HEIGHT: 67 IN | SYSTOLIC BLOOD PRESSURE: 162 MMHG | RESPIRATION RATE: 18 BRPM

## 2017-08-03 PROBLEM — R53.1 WEAKNESS GENERALIZED: Status: ACTIVE | Noted: 2017-08-03

## 2017-08-03 PROBLEM — G93.41 METABOLIC ENCEPHALOPATHY: Status: RESOLVED | Noted: 2017-08-03 | Resolved: 2017-08-03

## 2017-08-03 PROBLEM — G93.41 METABOLIC ENCEPHALOPATHY: Status: ACTIVE | Noted: 2017-08-03

## 2017-08-03 LAB
BACTERIA FLD CULT: NO GROWTH
GRAM STN SPEC: NORMAL
GRAM STN SPEC: NORMAL

## 2017-08-03 PROCEDURE — 97110 THERAPEUTIC EXERCISES: CPT

## 2017-08-03 PROCEDURE — 92611 MOTION FLUOROSCOPY/SWALLOW: CPT

## 2017-08-03 PROCEDURE — 74230 X-RAY XM SWLNG FUNCJ C+: CPT

## 2017-08-03 RX ORDER — ACETAMINOPHEN 325 MG/1
650 TABLET ORAL EVERY 6 HOURS PRN
Refills: 0
Start: 2017-08-03

## 2017-08-03 RX ORDER — ATORVASTATIN CALCIUM 80 MG/1
80 TABLET, FILM COATED ORAL NIGHTLY
Start: 2017-08-03

## 2017-08-03 RX ORDER — SENNA AND DOCUSATE SODIUM 50; 8.6 MG/1; MG/1
2 TABLET, FILM COATED ORAL 2 TIMES DAILY PRN
Start: 2017-08-03

## 2017-08-03 RX ORDER — TRAMADOL HYDROCHLORIDE 50 MG/1
50 TABLET ORAL EVERY 6 HOURS PRN
Qty: 12 TABLET | Refills: 0 | Status: SHIPPED | OUTPATIENT
Start: 2017-08-03

## 2017-08-03 RX ADMIN — APIXABAN 2.5 MG: 2.5 TABLET, FILM COATED ORAL at 10:26

## 2017-08-03 RX ADMIN — CYANOCOBALAMIN TAB 500 MCG 1000 MCG: 500 TAB at 10:25

## 2017-08-03 RX ADMIN — NYSTATIN 500000 UNITS: 100000 SUSPENSION ORAL at 14:10

## 2017-08-03 RX ADMIN — OXYCODONE HYDROCHLORIDE AND ACETAMINOPHEN 1 TABLET: 5; 325 TABLET ORAL at 00:19

## 2017-08-03 RX ADMIN — PREGABALIN 50 MG: 50 CAPSULE ORAL at 14:09

## 2017-08-03 RX ADMIN — PREGABALIN 50 MG: 50 CAPSULE ORAL at 10:26

## 2017-08-03 RX ADMIN — NYSTATIN 500000 UNITS: 100000 SUSPENSION ORAL at 10:26

## 2017-08-03 RX ADMIN — CARVEDILOL 3.12 MG: 3.12 TABLET, FILM COATED ORAL at 10:26

## 2017-08-03 RX ADMIN — OXYCODONE HYDROCHLORIDE AND ACETAMINOPHEN 1 TABLET: 5; 325 TABLET ORAL at 04:10

## 2017-08-03 RX ADMIN — ASPIRIN 81 MG: 81 TABLET, CHEWABLE ORAL at 10:26

## 2017-08-03 RX ADMIN — OXYCODONE HYDROCHLORIDE AND ACETAMINOPHEN 1 TABLET: 5; 325 TABLET ORAL at 14:09

## 2017-08-03 NOTE — PLAN OF CARE
Problem: Patient Care Overview (Adult)  Goal: Plan of Care Review  Outcome: Ongoing (interventions implemented as appropriate)    08/02/17 2020 08/03/17 0431   Coping/Psychosocial Response Interventions   Plan Of Care Reviewed With patient --    Patient Care Overview   Progress --  improving   Outcome Evaluation   Outcome Summary/Follow up Plan --  VSS. Cortrak remains in place-tolerating TF. ST to come today to re-eval pt with VFSS to help determine continued need for Cortrak. Continue to monitor.       Goal: Adult Individualization and Mutuality  Outcome: Ongoing (interventions implemented as appropriate)    Problem: Pain, Acute (Adult)  Goal: Acceptable Pain Control/Comfort Level  Outcome: Ongoing (interventions implemented as appropriate)    08/03/17 0431   Pain, Acute (Adult)   Acceptable Pain Control/Comfort Level making progress toward outcome  (PAIN MEDS AS NEEDED)         Problem: Fall Risk (Adult)  Goal: Absence of Falls  Outcome: Ongoing (interventions implemented as appropriate)    08/03/17 0431   Fall Risk (Adult)   Absence of Falls achieves outcome  (SAFETY MAINTAINED-BED ALARM ON)         Problem: Skin Integrity Impairment, Risk/Actual (Adult)  Goal: Skin Integrity/Wound Healing  Outcome: Ongoing (interventions implemented as appropriate)    08/03/17 0431   Skin Integrity Impairment, Risk/Actual (Adult)   Skin Integrity/Wound Healing making progress toward outcome         Problem: Nutrition, Enteral (Adult)  Goal: Signs and Symptoms of Listed Potential Problems Will be Absent or Manageable (Nutrition, Enteral)  Outcome: Ongoing (interventions implemented as appropriate)    08/03/17 0431   Nutrition, Enteral   Problems Assessed (Enteral Nutrition) all   Problems Present (Enteral Nutrition) none         Problem: Pressure Ulcer Risk (Brian Scale) (Adult,Obstetrics,Pediatric)  Goal: Skin Integrity  Outcome: Ongoing (interventions implemented as appropriate)    08/03/17 0431   Pressure Ulcer Risk (Brian  Scale) (Adult,Obstetrics,Pediatric)   Skin Integrity making progress toward outcome  (SKIN REMAINS INTACT)

## 2017-08-03 NOTE — DISCHARGE INSTR - DIET
Supervision w/ meals; no straws; meds whole w/ applesauce; must use liquid wash throughout meals; cue for double swallow; ok for ice chips

## 2017-08-03 NOTE — PLAN OF CARE
Problem: Patient Care Overview (Adult)  Goal: Plan of Care Review    08/03/17 0922   Coping/Psychosocial Response Interventions   Plan Of Care Reviewed With patient   Patient Care Overview   Progress improving   Outcome Evaluation   Outcome Summary/Follow up Plan VFSS completed. Moderate oropharyngeal dysphagia rec: Nectar thick, mech soft no mixed consistencies, no straws, meds whole w/ applesauce; liquid wash w/ solids & cue for double swallow. Ok for ice chips between meals         Problem: Inpatient SLP  Goal: Dysphagia- Patient will safely consume diet as per recommendation with no signs/symptoms of aspiration    07/27/17 1508 07/27/17 1521 08/03/17 0922   Safely Consume Diet   Safely Consume Diet- SLP, Date Established 07/27/17 --  --    Safely Consume Diet- SLP, Time to Achieve --  by discharge --    Safely Consume Diet- SLP, Additional Goal --  --  nectar thick w/ mech soft no mixed   Safely Consume Diet- SLP, Date Goal Reviewed --  --  08/03/17   Safely Consume Diet- SLP, Outcome --  --  goal revised

## 2017-08-03 NOTE — THERAPY TREATMENT NOTE
Acute Care - Physical Therapy Treatment Note  [facility]     Patient Name: Adrian Thibodeaux  : 1931  MRN: [id number]  Today's Date: 8/3/2017  Onset of Illness/Injury or Date of Surgery Date: 17          Admit Date: 2017    Visit Dx:    ICD-10-CM ICD-9-CM   1. Left lower quadrant pain R10.32 789.04   2. Leukocytosis, unspecified type D72.829 288.60   3. Renal insufficiency N28.9 593.9   4. Confusion R41.0 298.9   5. Generalized weakness R53.1 780.79     Patient Active Problem List   Diagnosis   • Right shoulder pain   • Paroxysmal atrial fibrillation   • Oropharyngeal dysphagia   • Aspiration pneumonia   • Knee effusion, left   • Weakness generalized               Adult Rehabilitation Note       17 0831 17 1202 17 0924    Rehab Assessment/Intervention    Discipline physical therapist  -CH occupational therapist  -SG physical therapist  -CH    Document Type therapy note (daily note)  -CH therapy note (daily note)  -SG therapy note (daily note)  -CH    Subjective Information agree to therapy  -CH agree to therapy  -SG agree to therapy  -CH    Patient Effort, Rehab Treatment good  -CH good  -SG good  -CH    Symptoms Noted During/After Treatment none  -CH  none  -CH    Precautions/Limitations fall precautions  -CH  fall precautions  -CH    Recorded by [CH] Kathia Saeed, PT [SG] Aniyah Lozada OTR [CH] Kathia Saeed, PT    Pain Assessment    Pain Assessment No/denies pain  -  0-10  -CH    Pain Score   8  -CH    Pain Location   Knee  -CH    Pain Orientation   Left  -CH    Pain Intervention(s)   Medication (See MAR);Repositioned  -CH    Recorded by [CH] Kathia Saeed, PT  [CH] Kathia Saeed, PT    Cognitive Assessment/Intervention    Current Cognitive/Communication Assessment functional  -CH functional  -SG functional  -CH    Orientation Status oriented x 4  -CH oriented x 4  -SG oriented x 4  -CH    Follows Commands/Answers Questions 100% of the time  -% of  the time;able to follow single-step instructions  - 100% of the time  -    Personal Safety WNL/WFL  -  WNL/WFL  -    Personal Safety Interventions fall prevention program maintained;gait belt;nonskid shoes/slippers when out of bed  -  fall prevention program maintained;gait belt;nonskid shoes/slippers when out of bed  -    Recorded by [] Kathia Saeed PT [SG] Aniyah Lozada OTR [] Kathia Saeed PT    ROM (Range of Motion)    General ROM Detail  moving RUE actively today.   -SG     Recorded by  [] Aniyah Lozada OTR     Bed Mobility, Assessment/Treatment    Bed Mob, Supine to Sit, [place] verbal cues required;nonverbal cues required (demo/gesture);minimum assist (75% patient effort)  -  verbal cues required;nonverbal cues required (demo/gesture);minimum assist (75% patient effort)  -    Bed Mob, Sit to Supine, [place] verbal cues required;nonverbal cues required (demo/gesture);contact guard assist  -  not tested   sitting in chair  -    Bed Mobility, Comment  sitting in chair  -     Recorded by [] Kathia Saeed PT [SG] Aniyah Lozada, ANDREWR [] Kathia Saeed PT    Transfer Assessment/Treatment    Transfers, Bed-Chair [place]   verbal cues required;nonverbal cues required (demo/gesture);moderate assist (50% patient effort);2 person assist required;hand held assist  -    Transfers, Sit-Stand [place] verbal cues required;nonverbal cues required (demo/gesture);minimum assist (75% patient effort);2 person assist required;hand held assist  -  verbal cues required;nonverbal cues required (demo/gesture);minimum assist (75% patient effort);2 person assist required;hand held assist  -    Transfers, Stand-Sit [place] verbal cues required;nonverbal cues required (demo/gesture);minimum assist (75% patient effort);2 person assist required;hand held assist  -CH  verbal cues required;nonverbal cues required (demo/gesture);minimum assist (75% patient  effort);2 person assist required;hand held assist  -    Transfer, Comment   pt with strong posterior lean and difficulty coming to full erect stand, small shuffling steps bed to chair  -    Recorded by [CH] Kathia Saeed, PT  [CH] Kathia Saeed, PT    Gait Assessment/Treatment    Gait, [place] Level verbal cues required;nonverbal cues required (demo/gesture);minimum assist (75% patient effort);2 person assist required;hand held assist  -      Gait, Distance (Feet) 5   bed to stretcher  -      Gait, Gait Deviations ngoc decreased;step length decreased;stride length decreased  -      Gait, Safety Issues balance decreased during turns;step length decreased  -      Gait, Impairments impaired balance;strength decreased  -      Gait, Comment pt did not exhibit a posterior lean today, balance and ability to weight shift is much more improved today compared to yesterday.  -      Recorded by [CH] Kathia Saeed PT      Upper Body Dressing Assessment/Training    UB Dressing Assess/Train, Clothing Type  doffing:;donning:;hospital gown  -     UB Dressing Assess/Train, Position  sitting  -     UB Dressing Assess/Train, Rea  set up required;supervision required  -     Recorded by  [SG] Aniyah Lozada OTR     Lower Body Dressing Assessment/Training    LB Dressing Assess/Train, Clothing Type  doffing:;donning:;slipper socks  -     LB Dressing Assess/Train, Position  sitting  -     LB Dressing Assess/Train, Rea  maximum assist (25% patient effort)  -     LB Dressing Assess/Train, Comment  discussed safety with technique with pt  -SG     Recorded by  [SG] Aniyah Lozada OTR     Grooming Assessment/Training    Grooming Assess/Train, Position  sitting  -     Grooming Assess/Train, Indepen Level  supervision required  -     Recorded by  [SG] Aniyah Lozada OTR     Positioning and Restraints    Pre-Treatment Position in bed  -  in bed  -    Post Treatment  Position other  -CH chair  -SG chair  -CH    In Chair  sitting;call light within reach;encouraged to call for assist;exit alarm on;with family/caregiver  -SG sitting;call light within reach;encouraged to call for assist;with nsg   RN present to give meds  -CH    Other Position with other staff   on stretcher with transporter present  -CH      Recorded by [CH] Kathia Saeed, PT [SG] Aniyah Lozada, OTR [CH] Kathia Saeed, PT      08/01/17 1600 08/01/17 1300 07/31/17 1500    Rehab Assessment/Intervention    Discipline  occupational therapist  -SG physical therapy assistant  -CW    Document Type  therapy note (daily note)  -SG therapy note (daily note)  -CW    Subjective Information  agree to therapy  -SG agree to therapy;complains of;weakness  -CW    Patient Effort, Rehab Treatment  adequate  - adequate  -CW    Precautions/Limitations  fall precautions;oxygen therapy device and L/min  -SG fall precautions;oxygen therapy device and L/min  -CW    Recorded by  [SG] Aniyah Lozada, OTR [CW] Adonay Maddox    Vital Signs    O2 Delivery Post Treatment   supplemental O2  -CW    Recorded by   [CW] Adonay Maddox    Pain Assessment    Pain Assessment No/denies pain  - No/denies pain  - 0-10  -CW    Pain Score   6  -CW    Post Pain Score   6  -CW    Pain Location   Shoulder  -CW    Pain Orientation   Right  -CW    Pain Intervention(s) Medication (See MAR)  - Medication (See MAR)  - Repositioned;Ambulation/increased activity  -CW    Response to Interventions long, meds helped shldr  - long, meds helped shldr  - long  -CW    Recorded by [] Mera Rendon PTA [JM] Mera Rendon PTA [CW] Adonay Maddox    Cognitive Assessment/Intervention    Current Cognitive/Communication Assessment  functional  -SG functional  -CW    Orientation Status  oriented x 4  -SG oriented x 4  -CW    Follows Commands/Answers Questions  100% of the time;able to follow single-step instructions  -% of the time   -CW    Personal Safety   WNL/WFL  -CW    Personal Safety Interventions   fall prevention program maintained;gait belt;muscle strengthening facilitated;nonskid shoes/slippers when out of bed  -CW    Recorded by  [SG] SAILAJA Fernandez [] Adonay Maddox    ROM (Range of Motion)    General ROM Detail  RUE remains limited with AROM  -SG     Recorded by  [SG] SAILAJA Fernandez     Bed Mobility, Assessment/Treatment    Bed Mob, Supine to Sit, [place] minimum assist (75% patient effort);verbal cues required  - minimum assist (75% patient effort)  - minimum assist (75% patient effort);supervision required;verbal cues required  -    Bed Mob, Sit to Supine, [place] not tested  - minimum assist (75% patient effort)  - minimum assist (75% patient effort)  -CW    Recorded by [] Mera Rendon PTA [SG] SAILAJA Fernandez [CW] Adonay Maddox    Transfer Assessment/Treatment    Transfers, Bed-Chair [place] minimum assist (75% patient effort);moderate assist (50% patient effort);2 person assist required;verbal cues required;nonverbal cues required (demo/gesture)  -      Transfers, Sit-Stand [place] 2 person assist required;minimum assist (75% patient effort);moderate assist (50% patient effort);hand held assist  -      Transfers, Stand-Sit [place] minimum assist (75% patient effort);2 person assist required  -  not tested  -    Transfer, Comment  shuffle steps to chair w/knees blocked  - just back to bed after sitting in chair per pt and daughter and nsg  -SG     Recorded by [] Mera Rendon PTA [] SAILAJA Fernandez [] Adonay Maddox    ADL Assessment/Intervention    Additional Documentation  --   adls limited at this time due to RUE limited function  -SG     Recorded by  [SG] SAILAJA Fernandez     Balance Skills Training    Sitting-Level of Assistance Close supervision;Contact guard  - Close supervision  - Close supervision  -     Sitting-Balance Support Feet supported  -  Feet supported  -    Sitting-Balance Activities Head control activities (Comment);Trunk control activities;Right UE Weight Bearing;Left UE Weight Bearing;Lateral lean;Forward lean  -  Trunk control activities  -CW    Sitting # of Minutes 15  -JM  10  -CW    Recorded by [JM] Mera Rendon PTA [] Aniyah Lozada, ANDREWR [CW] Adonay Maddox    Therapy Exercises    Bilateral Lower Extremities AROM:;10 reps;LAQ;knee flexion  -  AROM:;10 reps;sitting;ankle pumps/circles;hip flexion;LAQ  -CW    Recorded by [JM] Mera Rendon PTA  [CW] Adonay Maddox    Positioning and Restraints    Pre-Treatment Position in bed  - in bed  - in bed  -CW    Post Treatment Position chair  - bed  - bed  -CW    In Bed --   dtr to call out if needed, did not want call bell on his lap  - call light within reach;encouraged to call for assist;exit alarm on;with family/caregiver  -SG notified nsg;supine;call light within reach;encouraged to call for assist;exit alarm on;with family/caregiver  -CW    In Chair call light within reach;encouraged to call for assist;exit alarm on;with family/caregiver;notified nsg   dtr to call if needed, did not want call light on lap  -      Recorded by [] Mrea Rendon PTA [] Aniyah Lozada, SAILAJA [CW] Adonay Maddox      User Key  (r) = Recorded By, (t) = Taken By, (c) = Cosigned By    Initials Name Effective Dates     Aniyah Lozada, OTR 04/13/15 -      Kathia Saeed, PT 12/01/15 -     ALIN Rendon PTA 02/18/16 -     CW Adonay Maddox 12/13/16 -                 IP PT Goals       08/03/17 0837 07/27/17 1156       Bed Mobility PT LTG    Bed Mobility PT LTG, Time to Achieve 1 wk  -CH 1 wk  -CH     Bed Mobility PT LTG, Activity Type  all bed mobility  -CH     Bed Mobility PT LTG, [place] Level contact guard assist  -CH minimum assist (75% patient effort);2 person assist required  -     Transfer Training PT  LTG    Transfer Training PT LTG, Time to Achieve 1 wk  -CH 1 wk  -CH     Transfer Training PT LTG, Activity Type all transfers  -CH all transfers  -CH     Transfer Training PT LTG, [place] Level contact guard assist  -CH minimum assist (75% patient effort);2 person assist required  -CH     Transfer Training PT LTG, Assist Device walker, rolling  -CH walker, rolling  -CH     Transfer Training PT LTG, Outcome goal revised  -CH      Gait Training PT LTG    Gait Training Goal PT LTG, Time to Achieve 1 wk  -CH 1 wk  -CH     Gait Training Goal PT LTG, [place] Level minimum assist (75% patient effort)  -CH minimum assist (75% patient effort);2 person assist required  -CH     Gait Training Goal PT LTG, Assist Device walker, rolling  -CH walker, rolling  -CH     Gait Training Goal PT LTG, Distance to Achieve 30  -CH 30  -CH     Gait Training Goal PT LTG, Outcome goal ongoing  -CH      Gait Training Goal PT LTG, Reason Goal Not Met progress slower than expected  -CH        User Key  (r) = Recorded By, (t) = Taken By, (c) = Cosigned By    Initials Name Provider Type    DOMENICO Saeed, PT Physical Therapist          Physical Therapy Education     Title: PT OT SLP Therapies (Done)     Topic: Physical Therapy (Done)     Point: Mobility training (Done)    Learning Progress Summary    Learner Readiness Method Response Comment Documented by Status   Patient Acceptance E,TB,D VU,NR   08/03/17 0836 Done    Acceptance TB,E,D VU,NR   08/02/17 0928 Done    Eager TBED VU,NR   08/01/17 1629 Done    Acceptance E,TB DU,VU   07/31/17 1529 Done    Acceptance E VU   07/30/17 1613 Done    Acceptance E VU Attempted to weight shift in standing but patient has difficulty unweighting one leg to take even a small step [facility] 07/29/17 1422 Done    Acceptance E,TB DUVU   07/28/17 1511 Done    Acceptance E,TB,D VU,NR   07/27/17 1155 Done   Family Eager TB,E,D VU,NR   08/01/17 1629 Done               Point: Home exercise  program (Done)    Learning Progress Summary    Learner Readiness Method Response Comment Documented by Status   Patient Eager TB,E,D VU,NR   08/01/17 1629 Done    Acceptance E,TB DU,VU   07/31/17 1529 Done    Acceptance E,TB DU,VU   07/28/17 1511 Done   Family Eager TB,E,D VU,NR   08/01/17 1629 Done               Point: Body mechanics (Done)    Learning Progress Summary    Learner Readiness Method Response Comment Documented by Status   Patient Acceptance E,TB,D VU,NR   08/03/17 0836 Done    Acceptance TB,E,D VU,NR   08/02/17 0928 Done    Eager TB,E,D VU,NR   08/01/17 1629 Done    Acceptance E,TB DU,VU   07/31/17 1529 Done    Acceptance E VU Attempted to weight shift in standing but patient has difficulty unweighting one leg to take even a small step [facility] 07/29/17 1422 Done    Acceptance E,TB DU,VU   07/28/17 1511 Done    Acceptance E,TB,D VU,[facility] 07/27/17 1155 Done   Family Eager TB,E,D VU,NR   08/01/17 1629 Done               Point: Precautions (Done)    Learning Progress Summary    Learner Readiness Method Response Comment Documented by Status   Patient Acceptance E,TB,D VU,NR   08/03/17 0836 Done    Acceptance TB,E,D VU,[facility] 08/02/17 0928 Done    Eager TB,E,D VU,[facility] 08/01/17 1629 Done    Acceptance E,TB DU,VU   07/31/17 1529 Done    Acceptance E,TB DU,VU   07/28/17 1511 Done    Acceptance E,TB,D VU,[facility] 07/27/17 1155 Done   Family Eager TB,E,D VU,[facility] 08/01/17 1629 Done                      User Key     Initials Effective Dates Name Provider Type Discipline     12/01/15 -  Kathia Saeed, PT Physical Therapist PT     12/01/15 -  Roxy Van, PT Physical Therapist PT     02/18/16 -  Mera Rendon, PTA Physical Therapy Assistant PT    [facility] 06/22/16 -  Ana Thao, PT Physical Therapist PT     12/13/16 -  Adonay P Tan Physical Therapy Assistant PT                    PT Recommendation and Plan  Anticipated Discharge Disposition: skilled nursing  facility  Planned Therapy Interventions: balance training, bed mobility training, gait training, home exercise program, patient/family education, transfer training  PT Frequency: daily  Plan of Care Review  Plan Of Care Reviewed With: patient  Outcome Summary/Follow up Plan: Pt demonstrates increased functional strength and improved balance as he required less assistance for mobility and transfers. Pt exhibits less of a posterior lean during standing and was able to take bigger steps. PT goals have been updated and extended to reflect pt's progress.          Outcome Measures       08/03/17 0800 08/02/17 1223 08/02/17 0900    How much help from another person do you currently need...    Turning from your back to your side while in flat bed without using bedrails? 3  -CH  3  -CH    Moving from lying on back to sitting on the side of a flat bed without bedrails? 3  -CH  3  -CH    Moving to and from a bed to a chair (including a wheelchair)? 3  -CH  3  -CH    Standing up from a chair using your arms (e.g., wheelchair, bedside chair)? 3  -CH  2  -CH    Climbing 3-5 steps with a railing? 1  -CH  1  -CH    To walk in hospital room? 2  -CH  1  -CH    AM-PAC 6 Clicks Score 15  -CH  13  -CH    How much help from another is currently needed...    Putting on and taking off regular lower body clothing?  2  -SG     Bathing (including washing, rinsing, and drying)  2  -SG     Toileting (which includes using toilet bed pan or urinal)  2  -SG     Putting on and taking off regular upper body clothing  3  -SG     Taking care of personal grooming (such as brushing teeth)  3  -SG     Eating meals  1  -SG     Score  13  -SG     Functional Assessment    Outcome Measure Options AM-PAC 6 Clicks Basic Mobility (PT)  -CH  AM-PAC 6 Clicks Basic Mobility (PT)  -CH      08/01/17 1600 08/01/17 1346 07/31/17 1500    How much help from another person do you currently need...    Turning from your back to your side while in flat bed without using  bedrails? 3  -JM  3  -CW    Moving from lying on back to sitting on the side of a flat bed without bedrails? 3  -JM  3  -CW    Moving to and from a bed to a chair (including a wheelchair)? 2  -JM  2  -CW    Standing up from a chair using your arms (e.g., wheelchair, bedside chair)? 2  -JM  2  -CW    Climbing 3-5 steps with a railing? 1  -JM  1  -CW    To walk in hospital room? 1  -JM  1  -CW    AM-PAC 6 Clicks Score 12  -JM  12  -CW    How much help from another is currently needed...    Putting on and taking off regular lower body clothing?  1  -SG     Bathing (including washing, rinsing, and drying)  1  -SG     Toileting (which includes using toilet bed pan or urinal)  1  -SG     Putting on and taking off regular upper body clothing  1  -SG     Taking care of personal grooming (such as brushing teeth)  2  -SG     Eating meals  1  -SG     Score  7  -SG       User Key  (r) = Recorded By, (t) = Taken By, (c) = Cosigned By    Initials Name Provider Type    SG Aniyah Lozada, OTR Occupational Therapist    CH Kathia Saeed, PT Physical Therapist    ALIN Rendon, PTA Physical Therapy Assistant    ADONIS Maddox Physical Therapy Assistant           Time Calculation:         PT Charges       08/03/17 0840          Time Calculation    Start Time 0819  -      Stop Time 0830  -      Time Calculation (min) 11 min  -      PT Received On 08/03/17  -      PT - Next Appointment 08/04/17  -      PT Goal Re-Cert Due Date 08/10/17  -        User Key  (r) = Recorded By, (t) = Taken By, (c) = Cosigned By    Initials Name Provider Type     Kathia Saeed, PT Physical Therapist          Therapy Charges for Today     Code Description Service Date Service Provider Modifiers Qty    80660288916 HC PT THER PROC EA 15 MIN 8/2/2017 Kathia Saeed, PT GP 1    41430354408 HC PT THER SUPP EA 15 MIN 8/2/2017 Kathia Saeed, PT GP 1    97772527983 HC PT THER PROC EA 15 MIN 8/3/2017 Kathia Saeed, PT GP 1     [id number]  PT THER SUPP EA 15 MIN 8/3/2017 Kathia Saeed, PT GP 1          PT G-Codes  Outcome Measure Options: AM-PAC 6 Clicks Basic Mobility (PT)    Kathia Saeed, PT  8/3/2017

## 2017-08-03 NOTE — PROGRESS NOTES
[facility]    Physicians Statement of Medical Necessity for Ambulance Transportation    It is medically necessary for:    Patient Name: Adrian Thibodeaux    Insurance Information:      To be transported by ambulance:    From (if nursing facility, specify level of care: skilled, shelter, etc): [facility]    To (specify level of care if nursing facility): [facility]  Room 807    Date of Service: 8/3/2017      For dialysis patients state date dialysis began:     Diagnosis:   • **Aspiration pneumonia [J69.0] 07/30/2017   • Weakness generalized [R53.1] 08/03/2017   • Knee effusion, left [M25.462] 07/31/2017   • Right shoulder pain [M25.511] 07/27/2017   • Paroxysmal atrial fibrillation [I48.0] 07/27/2017   • Oropharyngeal dysphagia [R13.12] 07/27/2017       Resolved Hospital Problems     Diagnosis Date Noted Date Resolved   • Metabolic encephalopathy [G93.41] 08/03/2017 08/03/2017   • Confusion [R41.0] 07/27/2017 07/31/2017   • Leukocytosis [D72.829] 07/27/2017 07/31/2017   • JARRED (acute kidney injury) [N17.9] 07/26/2017 07/30/2017   • Left lower quadrant pain [R10.32]           Past Medical/Surgical History:  Past Medical History:   Diagnosis Date   • A-fib    • Arthritis    • Chronic back pain    • Coronary artery disease    • GERD (gastroesophageal reflux disease)    • Hyperlipidemia    • Hypertension    • Kyphosis    • Mitral regurgitation    • Prostate cancer    • PSVT (paroxysmal supraventricular tachycardia)       Past Surgical History:   Procedure Laterality Date   • APPENDECTOMY     • BACK SURGERY     • CARDIAC CATHETERIZATION     • KNEE SURGERY     • PROSTATE SURGERY     • TONSILLECTOMY          Current Objective Medical Evidence(including physical exam finding to support reason for limitations):    Immobilization syndrome    Other: Fall Risk, confusion intermittently, 2 person assist with transfers    Physician Signature:           (RN,NP,PA,CAN, Discharge Planner) Date/Time: 8/3/2017  10:23 AM        Printed Name:    _Tamia Salazar RN  _________________________________    [facility]   Phone: [phone] Phone: [phone] Phone: [phone]   Fax: [phone] Fax: [phone] Fax: [phone]

## 2017-08-03 NOTE — MBS/VFSS/FEES
Acute Care - Speech Language Pathology   Swallow Re-Evaluation [facility]     Patient Name: Adrian Thibodeaux  : 1931  MRN: [id number]  Today's Date: 8/3/2017  Onset of Illness/Injury or Date of Surgery Date: 17            Admit Date: 2017  SPEECH-LANGUAGE PATHOLOGY EVALUTION - VFSS #2  Subjective: The patient was seen on this date for a VFSS(Videofluoroscopic Swallowing Study).  Patient was alert and cooperative.    Objective: Risks/benefits were reviewed with the patient, and consent was obtained. The study was completed with SLP present and Radiologist review. The patient was seen in lateral view(s). Textures given included thin liquid, nectar thick liquid, puree consistency, mechanical soft consistency and regular consistency.  Assessment: Moderate oropharyngeal dysphagia characterized by posterior spillage resulting in deep silent penetration of thin liquids during the swallow.  Mixed consistencies were noted to penetrate deep to the cords during the swallow w/ trace silent aspiration also occurring over time during the swallow.  Trace silent penetration occurred w/ nectar thick by straw only.  Pt had absent epiglottic deflection & weak pharyngeal peristalsis leaving mild to moderate diffuse pharyngeal residue w/ liquids & moderate residue w/ solids after the swallow.  Using a liquid wash & double swallow helped to clear most of the residue. Pt demonstrated prolonged mastication & slow ap transit of solids.   Comments: Overall the swallow has improved from his last VFSS.   Recommendations: Diet Textures: nectar thick liquid, mechanical soft consistency with no mixed textures food. Medications should be taken whole with puree. May have Ice between meals after oral care, under staff or family supervision and with the recommended strategies for safe swallowing.  Recommended Strategies: Supervision w/ all meals; 2-3 bites followed by a drink throughout meals & meds, Upright for PO, small bites  and sips, double swallow with all PO intake--will need cues and no straw. Oral care before breakfast, after all meals and PRN.  Dysphagia therapy is recommended. Rationale: to improve the swallow function.  Pt has exercises to work on independently w/ family as well.     Visit Dx:     ICD-10-CM ICD-9-CM   1. Left lower quadrant pain R10.32 789.04   2. Leukocytosis, unspecified type D72.829 288.60   3. Renal insufficiency N28.9 593.9   4. Confusion R41.0 298.9   5. Generalized weakness R53.1 780.79     Patient Active Problem List   Diagnosis   • Right shoulder pain   • Paroxysmal atrial fibrillation   • Oropharyngeal dysphagia   • Aspiration pneumonia   • Knee effusion, left   • Weakness generalized     Past Medical History:   Diagnosis Date   • A-fib    • Arthritis    • Chronic back pain    • Coronary artery disease    • GERD (gastroesophageal reflux disease)    • Hyperlipidemia    • Hypertension    • Kyphosis    • Mitral regurgitation    • Prostate cancer    • PSVT (paroxysmal supraventricular tachycardia)      Past Surgical History:   Procedure Laterality Date   • APPENDECTOMY     • BACK SURGERY     • CARDIAC CATHETERIZATION     • KNEE SURGERY     • PROSTATE SURGERY     • TONSILLECTOMY            SWALLOW EVALUATION (last 72 hours)      Swallow Evaluation       08/03/17 0959                Rehab Evaluation    Document Type re-evaluation  -NB        Subjective Information agree to therapy  -NB        Symptoms Noted During/After Treatment none  -NB        General Information    Patient Profile Review yes  -NB        Current Diet Limitations NPO  -NB        Plans/Goals Discussed With patient  -NB        Barriers to Rehab none identified  -NB        Clinical Impression    Patient's Goals For Discharge return to PO diet  -NB        SLP Swallowing Diagnosis moderate dysphagia;oral dysfunction;pharyngeal dysfunction  -NB        Rehab Potential/Prognosis, Swallowing good, to achieve stated therapy goals  -NB         Criteria for Skilled Therapeutic Interventions Met skilled criteria for dysphagia intervention met  -NB        Therapy Frequency PRN  -NB        Predicted Duration Therapy Interv (days) until discharge  -NB        Expected Duration Therapy Session (min) 15-30 minutes  -NB        SLP Diet Recommendation IV - mechanical soft, no mixed consistencies;nectar/syrup-thick liquids  -NB        Recommended Feeding/Eating Techniques alternate between small bites and sips of food/liquid;maintain upright posture during/after eating for 30 mins;multiple swallow technique;no straws;small sips/bites  -NB        SLP Rec. for Method of Medication Administration meds whole in pudding/applesauce  -NB        Monitor For Signs Of Aspiration none - silent aspiration present  -NB        Anticipated Discharge Disposition [facility] nursing [facility]  -NB        Pain Assessment    Pain Assessment No/denies pain  -NB        Cognitive Assessment/Intervention    Current Cognitive/Communication Assessment impaired  -NB        Oral Motor Structure and Function    Oral Motor Anatomy and Physiology patient demonstrates anatomy that is WNL  -NB        Dentition Assessment missing teeth  -NB        Secretion Management WNL/WFL  -NB        Mucosal Quality moist, healthy  -NB        Volitional Swallow no difficulties initiating volitional swallow  -NB        Volitional Cough no difficulties initiating volitional cough  -NB        Oral Musculature General Assessment WNL (within normal limits)  -NB        SLP Communication to Radiology    Summary Statement Moderate oropharyngeal dysphagia characterized by deep silent penetration of thin liquids during the swallow.  Mixed consistencies were noted to penetrate deep during the swallow w/ trace silent aspiration during as well.  Trace silent penetration of nectar thick by straw only.  Mild to moderate pharyngeal residue throughout pharynx remained after the swallow.   -NB        Dysphagia Treatment Objectives  and Progress    Dysphagia Treatment Objectives Improve tongue base & pharyngeal wall squeeze  -NB        Improve tongue base & pharyngeal wall squeeze    To improve tongue base & pharyngeal wall squeeze, patient will: Complete gargle/hold retraction;Complete yawn/hold retractions;Complete tongue base retraction;Complete effortful swallow;Complete tongue hold swallow;90%;without cues  -NB        Status: Improve tongue base & pharyngeal wall squeeze New  -NB          User Key  (r) = Recorded By, (t) = Taken By, (c) = Cosigned By    Initials Name Effective Dates    MYRIAM Hugo MS [facility]-SLP 04/13/15 -         EDUCATION  The patient has been educated in the following areas:   Dysphagia (Swallowing Impairment).    SLP Recommendation and Plan  SLP Swallowing Diagnosis: moderate dysphagia, oral dysfunction, pharyngeal dysfunction  SLP Diet Recommendation: IV - mechanical soft, no mixed consistencies, nectar/syrup-thick liquids  Recommended Feeding/Eating Techniques: alternate between small bites and sips of food/liquid, maintain upright posture during/after eating for 30 mins, multiple swallow technique, no straws, small sips/bites  SLP Rec. for Method of Medication Administration: meds whole in pudding/applesauce  Monitor For Signs Of Aspiration: none - silent aspiration present     Criteria for Skilled Therapeutic Interventions Met: skilled criteria for dysphagia intervention met  Anticipated Discharge Disposition: skilled nursing facility  Rehab Potential/Prognosis, Swallowing: good, to achieve stated therapy goals  Therapy Frequency: PRN             Plan of Care Review  Plan Of Care Reviewed With: patient  Progress: improving  Outcome Summary/Follow up Plan: VFSS completed.  Moderate oropharyngeal dysphagia rec: Nectar thick, mech soft no mixed consistencies, no straws, meds whole w/ applesauce; liquid wash w/ solids & cue for double swallow.  Ok for ice chips between meals          IP SLP Goals       08/03/17 5954  07/31/17 0950 07/28/17 1034    Safely Consume Diet    Safely Consume Diet- SLP, Additional Goal nectar thick w/ mech soft no mixed  -NB      Safely Consume Diet- SLP, Date Goal Reviewed 08/03/17  -NB      Safely Consume Diet- SLP, Outcome goal revised  -NB goal ongoing  -CP goal ongoing  -SH      07/27/17 1521 07/27/17 1508       Safely Consume Diet    Safely Consume Diet- SLP, Date Established  07/27/17  -SA     Safely Consume Diet- SLP, Time to Achieve by discharge  -SA by discharge  -SA       User Key  (r) = Recorded By, (t) = Taken By, (c) = Cosigned By    Initials Name Provider Type    SA Aniyah Moody, MS CCC-SLP Speech and Language Pathologist    MYRIAM Hugo, MS CCC-SLP Speech and Language Pathologist    CARLITOS Gleason, MS CCC-SLP Speech and Language Pathologist     Angelika Hsu, MS CCC-SLP Speech and Language Pathologist             SLP Outcome Measures (last 72 hours)      SLP Outcome Measures       08/03/17 0925          SLP Outcome Measures    Outcome Measure Used? Adult NOMS  -NB      FCM Scores    FCM Chosen Swallowing  -NB      Swallowing FCM Score 4  -NB        User Key  (r) = Recorded By, (t) = Taken By, (c) = Cosigned By    Initials Name Effective Dates    MYRIAM Hugo MS CCC-SLP 04/13/15 -            Time Calculation:         Time Calculation- SLP       08/03/17 0932          Time Calculation- SLP    SLP Start Time 0815  -NB      SLP Stop Time 0930  -NB      SLP Time Calculation (min) 75 min  -NB      SLP Received On 08/03/17  -NB        User Key  (r) = Recorded By, (t) = Taken By, (c) = Cosigned By    Initials Name Provider Type    MYRIAM Hugo MS CCC-SLP Speech and Language Pathologist          Therapy Charges for Today     Code Description Service Date Service Provider Modifiers Qty    [id number] HC ST MOTION FLUORO EVAL SWALLOW 5 8/3/2017 Isabella Hugo MS CCC-SLP GN 1          SLP G-Codes  SLP NOMS Used?: Yes  Functional Limitations: Swallowing  Swallow Current Status  (): 100 percent impaired, limited or restricted  Swallow Goal Status (): At least 1 percent but less than 20 percent impaired, limited or restricted  Swallow Discharge Status (): 100 percent impaired, limited or restricted    Isabella Hugo MS CCC-SLP  8/3/2017

## 2017-08-03 NOTE — DISCHARGE SUMMARY
Date of Admission: 7/26/2017  Date of Discharge:  8/3/2017  Primary Care Physician: Symone Ford MD     Discharge Diagnosis:  Active Hospital Problems (** Indicates Principal Problem)    Diagnosis Date Noted   • **Aspiration pneumonia [J69.0] 07/30/2017   • Weakness generalized [R53.1] 08/03/2017   • Knee effusion, left [M25.462] 07/31/2017   • Right shoulder pain [M25.511] 07/27/2017   • Paroxysmal atrial fibrillation [I48.0] 07/27/2017   • Oropharyngeal dysphagia [R13.12] 07/27/2017      Resolved Hospital Problems    Diagnosis Date Noted Date Resolved   • Metabolic encephalopathy [G93.41] 08/03/2017 08/03/2017   • Confusion [R41.0] 07/27/2017 07/31/2017   • Leukocytosis [D72.829] 07/27/2017 07/31/2017   • JARRED (acute kidney injury) [N17.9] 07/26/2017 07/30/2017   • Left lower quadrant pain [R10.32] 07/26/2017 07/31/2017       Presenting Problem/History of Present Illness:  Confusion [R41.0]  Left lower quadrant pain [R10.32]  Renal insufficiency [N28.9]  Leukocytosis, unspecified type [D72.829]  Left lower quadrant pain [R10.32]  JARRED (acute kidney injury) [N17.9]     Hospital Course:  The patient is a 85 y.o. male who presented with Weakness, confusion and fatigue.  He had pain in the right shoulder.  He had gotten extremely weak and was not able to manage on his own.  He had acute kidney injury and leukocytosis at presentation.  There is no obvious source initially.  He was found to have significant dysphagia and aspiration.  He was treated and completed a course of antibiotics for aspiration pneumonia.  Ortho saw him for chronic rotator cuff tear arthropathy.  He injected the right shoulder.  He was also noted to have a left knee effusion.  Plain films were done and showed arthritis.  Dr. Puckett aspirated and injected the left knee.  The effusion has resolved.    Patient's mental status has gradually improved.  He was seen by neurology regarding the abnormal MRI and CTA.  Medical management was  "recommended: Aspirin and statin with continuation of the patient's Eliquis.  Patient has anemia with low iron stores consistent with chronic disease.  Outpatient follow-up recommended.    Reviewing medical records regarding recent diagnosis of PAF, it was noted in the \"care everywhere\" tab that patient had A. fib at the [facility] in July 2017.  Eliquis was started at that time.  Holter monitor showed SVT in April 2017.  He is to follow-up at [facility].    Dr. Rivera evaluated the patient for rehabilitation.  Patient had the cor Trak in until last night when it was extensively pulled out.  Speech therapy to reevaluate today as was originally planned to determine if patient safe for po intake.  If not cor Trak will be replaced.  He is ready for discharge to rehabilitation once that issue has been addressed.    Stable condition; fair prognosis    Exam Today:  Feels okay.  Cor Trak came out during the night.  Patient says he got tangled up.  Vital signs noted.  No distress.  Heart is regular without murmur.  Lungs are clear.  Breath sounds decreased but equal.  Abdomen is soft and nontender.  Extremities no edema.  Left knee effusion resolved    Procedures Performed:  CT abdomen pelvis without contrast 7/27/17 which showed severe changes of COPD with scarring at the right base.  Tiny right pleural effusion.  Small amount of dense sludge or tiny gallstone within the gallbladder.  Prominent calcification of the abdominal aorta with borderline enlargement infrarenal aorta 2.3 cm.  Moderate amount of semisolid stool extending into rectosigmoid colon  CT of the head without contrast 7/26/17 no acute abnormalities  CT angiogram of the head and neck with and without contrast 7/28/17 which showed mild small vessel disease.  Old infarcts.  Moderate narrowing of the left vertebral artery origin.  Occlusion of the right vertebral artery from origin to right C6 transverse foramen.  Please see report for complete " details  Video swallow study 7/27/17 which showed moderate to severe dysphagia  MRA of the head and neck with and without contrast 7/27/17 which showed irregularity without significant stenosis involving carotids.  Signal loss involving left vertebral artery.  MRI of the brain with and without contrast 7/27/17 which showed no acute stroke.  Mild small vessel ischemic disease  Right shoulder injection 7/27/17 by Dr. Puckett  Left knee injection and aspiration 8/1/17 by Dr. Puckett    Labs  Left knee aspiration: 5700 red blood cells, 1568 white blood cells with 75% segs, 20% lymphs.  No crystals.  No organisms.  7/30/17 white count 10.1 hemoglobin 11.5 platelets 482,000  7/30/17 Glucose 132 BUN 24 creatinine 1 sodium 138 potassium 4.1 chloride 103 bicarbonate 24  Iron 26, iron saturation 13, transferrin 134 -  all low.  TIBC normal at 200.  Ferritin high at 960  Uric acid 2.6  B-12 417.  TSH 1  A1c 5.  Total cholesterol 80 triglycerides 67 HDL 19 LDL 48  Respiratory viral panel negative  Urine culture 25,000 colony forming units of enterococcus viridans  Blood cultures no growth    Consults:    Dr. Italo Kenney    Discharge Disposition:  Skilled Nursing Facility (DC - External)    Discharge Medications:   Adrian Thibodeaux   Home Medication Instructions DIMPLE:[id number]    Printed on:08/03/17 1007   Medication Information                      acetaminophen (TYLENOL) 325 MG tablet  Take 2 tablets by mouth Every 6 (Six) Hours As Needed for Mild Pain (1-3).             apixaban (ELIQUIS) 2.5 MG tablet tablet  Take 2.5 mg by mouth 2 (Two) Times a Day.             aspirin 81 MG EC tablet  Take 81 mg by mouth Daily.             atorvastatin (LIPITOR) 80 MG tablet  Take 1 tablet by mouth Every Night.             azelastine (ASTELIN) 0.1 % nasal spray  2 sprays into each nostril Daily As Needed for Rhinitis. Use in each nostril as directed             carvedilol (COREG) 3.125 MG tablet  Take 3.125 mg  by mouth 2 (Two) Times a Day With Meals.             doxazosin (CARDURA) 4 MG tablet  Take 4 mg by mouth Every Morning.             memantine (NAMENDA) 10 MG tablet  Take 10 mg by mouth Every Night.             multivitamin (THERAGRAN) tablet tablet  Take 1 tablet by mouth Daily.             nystatin (MYCOSTATIN) 845492 UNIT/ML suspension  Take 5 mL by mouth 4 (Four) Times a Day for 3 days.             pregabalin (LYRICA) 50 MG capsule  Take 50 mg by mouth 2 (Two) Times a Day. With breakfast and lunch             rivastigmine (EXELON) 1.5 MG capsule  Take 1.5 mg by mouth 2 (Two) Times a Day.             sennosides-docusate sodium (SENOKOT-S) 8.6-50 MG tablet  Take 2 tablets by mouth 2 (Two) Times a Day As Needed for Constipation.             traMADol (ULTRAM) 50 MG tablet  Take 1 tablet by mouth Every 6 (Six) Hours As Needed for Moderate Pain (4-6).             vitamin B-12 (VITAMIN B-12) 1000 MCG tablet  Take 1 tablet by mouth Daily.                 Discharge Diet:   Diet Instructions     Diet: Dysphagia; Nectar / Syrup Thick Liquids; Mechanical Soft       Discharge Diet:  Dysphagia   Fluid Consistency:  Nectar / Syrup Thick Liquids   Pureed Options:  Mechanical Soft               Supervision w/ meals; no straws; meds whole w/ applesauce; must use liquid wash throughout meals; cue for double swallow; ok for ice chips                  Activity at Discharge:   Activity Instructions     Other Instructions (Specify)       Up with assistance.  Continue with therapy- speech, PT, OT                 Follow-up Appointments:  Rehabilitation MD in one day  Dr. Symone Ford 1-2 weeks after release from rehabilitation    Future Appointments  Date Time Provider Department Center   8/16/2017 10:20 AM MD JESIKA Agustin [facility]         Test Results Pending at Discharge: None       Dhara Morris MD  08/03/17  10:07 AM    Time Spent on Discharge Activities: 40 minutes.  Discussed with patient.  Medical record  reviewed.  Discussed with CCP

## 2017-08-03 NOTE — PLAN OF CARE
Problem: Patient Care Overview (Adult)  Goal: Plan of Care Review  Outcome: Ongoing (interventions implemented as appropriate)    08/03/17 0837   Coping/Psychosocial Response Interventions   Plan Of Care Reviewed With patient   Outcome Evaluation   Outcome Summary/Follow up Plan Pt demonstrates increased functional strength and improved balance as he required less assistance for mobility and transfers. Pt exhibits less of a posterior lean during standing and was able to take bigger steps. PT goals have been updated and extended to reflect pt's progress.         Problem: Inpatient Physical Therapy  Goal: Bed Mobility Goal LTG- PT  Outcome: Ongoing (interventions implemented as appropriate)    08/03/17 0837   Bed Mobility PT LTG   Bed Mobility PT LTG, Time to Achieve 1 wk   Bed Mobility PT LTG, [place] Level contact guard assist       Goal: Transfer Training Goal 1 LTG- PT  Outcome: Ongoing (interventions implemented as appropriate)    08/03/17 0837   Transfer Training PT LTG   Transfer Training PT LTG, Time to Achieve 1 wk   Transfer Training PT LTG, Activity Type all transfers   Transfer Training PT LTG, [place] Level contact guard assist   Transfer Training PT LTG, Assist Device walker, rolling   Transfer Training PT LTG, Outcome goal revised       Goal: Gait Training Goal LTG- PT  Outcome: Ongoing (interventions implemented as appropriate)    08/03/17 0837   Gait Training PT LTG   Gait Training Goal PT LTG, Time to Achieve 1 wk   Gait Training Goal PT LTG, [place] Level minimum assist (75% patient effort)   Gait Training Goal PT LTG, Assist Device walker, rolling   Gait Training Goal PT LTG, Distance to Achieve 30   Gait Training Goal PT LTG, Outcome goal ongoing   Gait Training Goal PT LTG, Reason Goal Not Met progress slower than expected

## 2017-08-03 NOTE — PLAN OF CARE
Problem: Patient Care Overview (Adult)  Goal: Plan of Care Review  Outcome: Outcome(s) achieved Date Met:  08/03/17  Goal: Adult Individualization and Mutuality  Outcome: Outcome(s) achieved Date Met:  08/03/17  Goal: Discharge Needs Assessment  Outcome: Outcome(s) achieved Date Met:  08/03/17    Problem: Pain, Acute (Adult)  Goal: Acceptable Pain Control/Comfort Level  Outcome: Outcome(s) achieved Date Met:  08/03/17    Problem: Fall Risk (Adult)  Goal: Absence of Falls  Outcome: Outcome(s) achieved Date Met:  08/03/17    Problem: Skin Integrity Impairment, Risk/Actual (Adult)  Goal: Skin Integrity/Wound Healing  Outcome: Outcome(s) achieved Date Met:  08/03/17    Problem: Nutrition, Enteral (Adult)  Goal: Signs and Symptoms of Listed Potential Problems Will be Absent or Manageable (Nutrition, Enteral)  Outcome: Outcome(s) achieved Date Met:  08/03/17    Problem: Pressure Ulcer Risk (Brian Scale) (Adult,Obstetrics,Pediatric)  Goal: Skin Integrity  Outcome: Outcome(s) achieved Date Met:  08/03/17

## 2017-08-03 NOTE — NURSING NOTE
"Pt was found by nurse's aid in room about 5:30am with the Cortrak removed from his nose. Pt states he got \"tied up\" and it came out. Dr. Hopson made aware this morning.   "

## 2017-08-07 NOTE — PROGRESS NOTES
Continued Stay Note  [facility]     Patient Name: Adrian Thibodeaux  MRN: [id number]  Today's Date: 8/7/2017    Admit Date: 7/26/2017          Discharge Plan       08/07/17 0943    Final Note    Final Note DC to [facility] via ambulance              Discharge Codes       08/07/17 0944    Discharge Codes    Discharge Codes FF  Rehab other        Expected Discharge Date and Time     Expected Discharge Date Expected Discharge Time    Aug 3, 2017             Tamia Salazar RN

## 2018-11-01 ENCOUNTER — TRANSCRIBE ORDERS (OUTPATIENT)
Dept: ADMINISTRATIVE | Facility: HOSPITAL | Age: 83
End: 2018-11-01

## 2018-11-01 DIAGNOSIS — C61 PROSTATE CANCER (HCC): Primary | ICD-10-CM

## 2018-11-13 ENCOUNTER — HOSPITAL ENCOUNTER (OUTPATIENT)
Dept: NUCLEAR MEDICINE | Facility: HOSPITAL | Age: 83
Discharge: HOME | End: 2018-11-13
Attending: UROLOGY

## 2018-11-13 ENCOUNTER — HOSPITAL ENCOUNTER (OUTPATIENT)
Dept: GENERAL RADIOLOGY | Facility: HOSPITAL | Age: 83
Discharge: HOME | End: 2018-11-13
Attending: UROLOGY

## 2018-11-13 ENCOUNTER — HOSPITAL ENCOUNTER (OUTPATIENT)
Dept: GENERAL RADIOLOGY | Facility: HOSPITAL | Age: 83
Discharge: HOME | End: 2018-11-13
Attending: UROLOGY | Admitting: UROLOGY

## 2018-11-13 DIAGNOSIS — C61 PROSTATE CANCER (HCC): ICD-10-CM

## 2018-11-13 PROCEDURE — A9503 TC99M MEDRONATE: HCPCS | Performed by: UROLOGY

## 2018-11-13 PROCEDURE — 72072 X-RAY EXAM THORAC SPINE 3VWS: CPT

## 2018-11-13 PROCEDURE — 72110 X-RAY EXAM L-2 SPINE 4/>VWS: CPT

## 2018-11-13 PROCEDURE — 0 TECHNETIUM MEDRONATE KIT: Performed by: UROLOGY

## 2018-11-13 PROCEDURE — 78306 BONE IMAGING WHOLE BODY: CPT

## 2018-11-13 RX ORDER — TC 99M MEDRONATE 20 MG/10ML
21 INJECTION, POWDER, LYOPHILIZED, FOR SOLUTION INTRAVENOUS
Status: COMPLETED | OUTPATIENT
Start: 2018-11-13 | End: 2018-11-13

## 2018-11-13 RX ADMIN — Medication 21 MILLICURIE: at 11:52

## 2020-09-20 NOTE — PROGRESS NOTES
Continued Stay Note  [facility]     Patient Name: Adrian Thibodeaux  MRN: [id number]  Today's Date: 8/1/2017    Admit Date: 7/26/2017          Discharge Plan       08/01/17 0826    Case Management/Social Work Plan    Plan [facility] rehab referral    Additional Comments Referral [facility].  Spoke with Mera roberson.She requests a evaluation from Dr Arora. Notified Diamante for Dr Morris to obtain consult.  CCP following              Discharge Codes     None        Expected Discharge Date and Time     Expected Discharge Date Expected Discharge Time    Jul 27, 2017             Tamia Salazar RN     Opt out